# Patient Record
Sex: FEMALE | Race: WHITE | Employment: FULL TIME | URBAN - METROPOLITAN AREA
[De-identification: names, ages, dates, MRNs, and addresses within clinical notes are randomized per-mention and may not be internally consistent; named-entity substitution may affect disease eponyms.]

---

## 2017-07-25 ENCOUNTER — PRE VISIT (OUTPATIENT)
Dept: SURGERY | Facility: CLINIC | Age: 22
End: 2017-07-25

## 2017-07-25 NOTE — TELEPHONE ENCOUNTER
1.  Date/reason for appt: 8/2/17 9AM - NBS Consult  2.  Referring provider: self  3.  Call to patient (Yes / No - short description): Yes - LVM for pt to return call, any outside recs?  4.  Previous care at / records requested from: Unknown

## 2017-07-25 NOTE — TELEPHONE ENCOUNTER
Pt returned call -- recent lab testing is at [facility].    She was also seen a nutritionist at [facility] when she was pregnant.    Will email [employer] forms to pt -- [email or web address]

## 2017-07-28 ENCOUNTER — CARE COORDINATION (OUTPATIENT)
Dept: SURGERY | Facility: CLINIC | Age: 22
End: 2017-07-28

## 2017-08-02 ENCOUNTER — ALLIED HEALTH/NURSE VISIT (OUTPATIENT)
Dept: SURGERY | Facility: CLINIC | Age: 22
End: 2017-08-02

## 2017-08-02 ENCOUNTER — OFFICE VISIT (OUTPATIENT)
Dept: SURGERY | Facility: CLINIC | Age: 22
End: 2017-08-02

## 2017-08-02 VITALS
OXYGEN SATURATION: 96 % | BODY MASS INDEX: 44.41 KG/M2 | HEART RATE: 85 BPM | WEIGHT: 293 LBS | TEMPERATURE: 97.9 F | HEIGHT: 68 IN | SYSTOLIC BLOOD PRESSURE: 135 MMHG | DIASTOLIC BLOOD PRESSURE: 83 MMHG

## 2017-08-02 DIAGNOSIS — E66.01 MORBID OBESITY, UNSPECIFIED OBESITY TYPE (H): Primary | ICD-10-CM

## 2017-08-02 DIAGNOSIS — E66.01 MORBID OBESITY, UNSPECIFIED OBESITY TYPE (H): ICD-10-CM

## 2017-08-02 LAB
ALBUMIN SERPL-MCNC: 3.6 G/DL (ref 3.4–5)
ALP SERPL-CCNC: 81 U/L (ref 40–150)
ALT SERPL W P-5'-P-CCNC: 23 U/L (ref 0–50)
ANION GAP SERPL CALCULATED.3IONS-SCNC: 11 MMOL/L (ref 3–14)
AST SERPL W P-5'-P-CCNC: 14 U/L (ref 0–45)
BILIRUB SERPL-MCNC: 0.7 MG/DL (ref 0.2–1.3)
BUN SERPL-MCNC: 12 MG/DL (ref 7–30)
CALCIUM SERPL-MCNC: 8.8 MG/DL (ref 8.5–10.1)
CHLORIDE SERPL-SCNC: 105 MMOL/L (ref 94–109)
CO2 SERPL-SCNC: 20 MMOL/L (ref 20–32)
CREAT SERPL-MCNC: 0.75 MG/DL (ref 0.52–1.04)
DEPRECATED CALCIDIOL+CALCIFEROL SERPL-MC: 28 UG/L (ref 20–75)
ERYTHROCYTE [DISTWIDTH] IN BLOOD BY AUTOMATED COUNT: 14.3 % (ref 10–15)
GFR SERPL CREATININE-BSD FRML MDRD: NORMAL ML/MIN/1.7M2
GLUCOSE SERPL-MCNC: 91 MG/DL (ref 70–99)
HBA1C MFR BLD: 5.6 % (ref 4.3–6)
HCT VFR BLD AUTO: 39.3 % (ref 35–47)
HGB BLD-MCNC: 12.4 G/DL (ref 11.7–15.7)
MCH RBC QN AUTO: 25.8 PG (ref 26.5–33)
MCHC RBC AUTO-ENTMCNC: 31.6 G/DL (ref 31.5–36.5)
MCV RBC AUTO: 82 FL (ref 78–100)
PLATELET # BLD AUTO: 391 10E9/L (ref 150–450)
POTASSIUM SERPL-SCNC: 3.8 MMOL/L (ref 3.4–5.3)
PROT SERPL-MCNC: 8.7 G/DL (ref 6.8–8.8)
PTH-INTACT SERPL-MCNC: 123 PG/ML (ref 12–72)
RBC # BLD AUTO: 4.81 10E12/L (ref 3.8–5.2)
SODIUM SERPL-SCNC: 136 MMOL/L (ref 133–144)
WBC # BLD AUTO: 10.8 10E9/L (ref 4–11)

## 2017-08-02 NOTE — PATIENT INSTRUCTIONS
GOALS:  Relating To Eating:  Eat slowly (20-30 minutes per meal), chewing foods well (25 chews per bite/applesauce consistency)  Focus on lean protein (skinless turkey/chicken breast, eggs, plain Greek yogurt, pork chop without rim of fat, etc) and non-starchy vegetables (green beans, celery, cucumber, carrots, broccoli, cauliflower, salad)/whole fruit at each meal.    Relating to beverages:  Reduce caffeine/carbonation/calorie containing beverages  Separate fluids from meals by 30 minutes before, during, and after eating    Relating to dietary supplements:  Start a multivitamin containing iron daily    Relating to activity:  Increase activity level.  Exercise 2-3 days/week for 20 minutes    Relating to cravings:  Practice alternatives to emotion eating (try making a list).  Rid your environment of baked goods and other sweet triggers.

## 2017-08-02 NOTE — TELEPHONE ENCOUNTER
Records received from [facility].   Included  Office notes: 3/17/16(dietician)  Radiology reports: US gallbladder 5/29/16

## 2017-08-02 NOTE — TELEPHONE ENCOUNTER
Consults & lab reports received from [facility], will forward to clinic.    Per fax - pt has no GI, nutrition, and sleep records.

## 2017-08-02 NOTE — PATIENT INSTRUCTIONS
"See dietitian in 1 month  Labs today    Bariatric Task List  Status:  Is patient a candidate for bariatric surgery?:    - Dr Akins Feb 2018 Sleeve      Patient Info: Initial Height:  5;8\" -     Initial Weight:  369 -     Initial BMI:  56.11 -     Date of Initial Weight/Height:  8/2/2017 -     Goal Weight (lbs):  333 -     Required Weight Loss:  36 -     Surgery Type:  sleeve gastrectomy -     Multidisciplinary Meeting:    -        Insurance:  Insurance:  Yes -     Insurance Type:  BCBS Federal -     BCBS: Wt Hx x 2 Years:  Needed -        Patient Education:  Information Session:  Completed -     Given \"Making your decision\" handout?:  Yes -     Given support group information?:  Yes -     Attended support group?:  Yes -     Support plan in place?:  Completed -     Research consents signed?:  Yes -        PCP:  Establish care with PCP:  Completed -     Follow up with PCP:    -     PCP letter of support:  Needed -        Psychological Evaluation:  Psych eval:  Needed -     Other:    -        Dietician Visits: Structured weight loss required?:  Yes -     Number of Visits:  6 -     Dietician Visit 1:  Completed -     Dietician Visit 2:  Needed -     Dietician Visit 3:  Needed -     Dietician Visit 4:  Needed -     Dietician Visit 5:  Needed -     Dietician Visit 6:  Needed -        Lab Work: Complete Blood Count:  Needed -     Comprehensive Metabolic Panel:  Needed -     Vitamin D:  Needed -     PTH:  Needed -     Hgb A1c:  Needed -        Consults:  Hematology Consult:  Needed -     Sleep Medicine Testing/Consult:  Needed -        Final Tasks:  Before surgery online class:  Needed -     Before surgery online class website link:  [email or web address]   After surgery online class:  Needed -     After surgery online class website link:  [email or web address]   Nurse visit for weigh-in and information:  Needed -     Pre-assessment clinic visit with anesthesia team for H&P:  Needed -   "   Final labs (Hgb, plt, T&S, UA):  Needed -        Notes:   -

## 2017-08-02 NOTE — PROGRESS NOTES
"New Bariatric Surgery Consultation Note    RE: Graciela Christianson  MR#: [id number]  : 1995      Referring provider: No flowsheet data found.    Chief Complaint/Reason for visit: evaluation for possible weight loss surgery    Dear Jorge Luis Kirkland (General),    I had the pleasure of seeing your patient, Graciela Christianson, to evaluate her obesity and consider her for possible weight loss surgery. As you know, Graciela Christianson is 21 year old.  She has a height of 5' 8\", a weight of 369 lbs 0 oz, and calculated Body mass index is 56.11 kg/(m^2).      HISTORY OF PRESENT ILLNESS:  Weight Loss History Reviewed with Patient 2017   How long have you been overweight? Since puberty   What is the most that you have ever weighed? 390   What is the most weight you have lost? 40   I have tried the following methods to lose weight Watching portions or calories, Weight Watchers, Slimfast   I have tried the following weight loss medications? (Check all that apply) None   Have you ever had weight loss surgery? No       CO-MORBIDITIES OF OBESITY INCLUDE:     2017   I have the following co-morbidities associated with obesity: None of the above       PAST MEDICAL HISTORY:  Past Medical History:   Diagnosis Date     Bone and joint disord back, pelvis, leg complicat preg, childb, puerp     hip issues when was 10     Depressive disorder     after baby depression     Gallbladder problem 2016    gallbladder was removed       PAST SURGICAL HISTORY:  Past Surgical History:   Procedure Laterality Date     CHOLECYSTECTOMY         FAMILY HISTORY:   Family History   Problem Relation Age of Onset     DIABETES Paternal Grandfather      Hypertension Paternal Grandfather      Hyperlipidemia Father      Obesity Father      Colon Cancer Maternal Grandmother      Depression Paternal Grandmother      Anxiety Disorder Paternal Grandmother      MENTAL ILLNESS Paternal Grandmother      Obesity Mother      Obesity Sister      " Obesity Cousin      Ovarian Cancer Other        SOCIAL HISTORY:   Social History Questions Reviewed With Patient 7/24/2017   Which best describes your employment status (select all that apply) 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 Yes   Who do you have in your support network that can be available to help you for the first 2 weeks after surgery? my fiancé and my mother   Who can you count on for support throughout your weight loss surgery journey? my fiancé and my mother   Can you afford 3 meals a day?  Yes   Can you afford 50-60 dollars a month for vitamins? No   6 year old daughter and twin girls age 1, step daughters 9 and 8    HABITS:     7/24/2017   How often do you drink alcohol? Monthly or less   If you do drink alcohol, how many drinks might you have in a day? (one drink = 5 oz. wine, 1 can/bottle of beer, 1 shot liquor) 1 or 2   Do you currently use any of the following Nicotine products? No   Have you ever used any of the following nicotine products? No   Have you or are you currently using street drugs or prescription strength medication for which you do not have a prescription for? No   Do you have a history of chemical dependency (alcohol or drug abuse)? No       PSYCHOLOGICAL HISTORY:   Psychological History Reviewed With Patient 7/24/2017   Have you ever attempted suicide? Never.   Have you had thoughts of suicide in the past year? No   Have you ever been hospitalized for mental illness or a suicide attempt? Never.   Do you have a history of chronic pain? No   Have you ever been diagnosed with fibromyalgia? No   Are you currently being treated for any of the following? (select all that apply) I do not have a mental illness   Postpartum depression, wasn't treated with medications.     ROS:     7/24/2017   Skin:  None of the above   HEENT: Headaches, Dizziness/lightheadedness   Musculoskeletal: None of the above  "  Cardiovascular: Shortness of breath with activity   Pulmonary: Shortness of breath with activity, Snoring, Excessively sleep during the day, Expereince morning headaches   Gastrointestinal: Reflux, Constipation   Genitourinary: None of the above   Hematological: Pulmonary embolism (PE)   Neurological: None of the above   Female ONLY: Birth control       EATING BEHAVIORS:     7/24/2017   Have you or anyone else thought that you had an eating disorder? Yes   If you answered yes to the previous eating disorder question, select the types that apply from this list: Binge Eating   Do you currently binge eat (eat a large amount of food in a short time)? Yes   Are you an emotional eater? Yes   Do you get up to eat after falling asleep? No       EXERCISE:     7/24/2017   How often do you exercise? 1 to 2 times per week   What is the duration of your exercise (in minutes)? 20 Minutes   What types of exercise do you do? walking   What keeps you from being more active?  I should be more active but I just have not gotten around to it, Shortness of breath, Too tired, Worried people will look at me     MEDICATIONS:  No current outpatient prescriptions on file.     No current facility-administered medications for this visit.        ALLERGIES:  Allergies   Allergen Reactions     Lactose      Other reaction(s): Other - Describe In Comment Field  Epigastric pain       LABS/IMAGING/MEDICAL RECORDS REVIEW:     PHYSICAL EXAM:  /83  Pulse 85  Temp 97.9  F (36.6  C) (Oral)  Ht 5' 8\"  Wt (!) 369 lb  SpO2 96%  BMI 56.11 kg/m2  General: NAD  Neurologic: A & O x 3, gait normal  Head: normocephalic, atraumatic  HEENT: PERRL, EOMI.   Respiratory: respirations unlabored  Abdomen: Obese, Soft NT ND   Extremities: No LE swelling   Skin: warm and dry.  No rashes on exposed skin  Psychiatric: Mentation and Affect appear normal      In summary, Graciela Christianson has Class III obesity with a body mass index of Body mass index is 56.11 " "kg/(m^2). kg/m2 and the comorbidities stated above. She completed an informational seminar and is a candidate for the laparoscopic gastric sleeve.  She will have to complete the following pre-requisites:    Bariatric Task List  Status:  Is patient a candidate for bariatric surgery?:    - Dr Akins Feb 2018 Sleeve      Patient Info: Initial Height:  5;8\" -     Initial Weight:  369 -     Initial BMI:  56.11 -     Date of Initial Weight/Height:  8/2/2017 -     Goal Weight (lbs):  333 -     Required Weight Loss:  36 -     Surgery Type:  sleeve gastrectomy -     Multidisciplinary Meeting:    -        Insurance:  Insurance:  Yes -     Insurance Type:  BCBS Federal -     BCBS: Wt Hx x 2 Years:  Needed -        Patient Education:  Information Session:  Completed -     Given \"Making your decision\" handout?:  Yes -     Given support group information?:  Yes -     Attended support group?:  Yes -     Support plan in place?:  Completed -     Research consents signed?:  Yes -        PCP:  Establish care with PCP:  Completed -     Follow up with PCP:    -     PCP letter of support:  Needed -        Psychological Evaluation:  Psych eval:  Needed -     Other:    -        Dietician Visits: Structured weight loss required?:  Yes -     Number of Visits:  6 -     Dietician Visit 1:  Completed -     Dietician Visit 2:  Needed -     Dietician Visit 3:  Needed -     Dietician Visit 4:  Needed -     Dietician Visit 5:  Needed -     Dietician Visit 6:  Needed -        Lab Work: Complete Blood Count:  Needed -     Comprehensive Metabolic Panel:  Needed -     Vitamin D:  Needed -     PTH:  Needed -     Hgb A1c:  Needed -        Consults:  Hematology Consult:  Needed -     Sleep Medicine Testing/Consult:  Needed -        Final Tasks:  Before surgery online class:  Needed -     Before surgery online class website link:  [email or web address]   After surgery online class:  Needed -     After surgery online class website link: "  [email or web address]   Nurse visit for weigh-in and information:  Needed -     Pre-assessment clinic visit with anesthesia team for H&P:  Needed -     Final labs (Hgb, plt, T&S, UA):  Needed -        Notes:   -           Today in the office we discussed gastric sleeve surgery. Preoperative, perioperative, and postoperative processes, management, and follow up were addressed.  Risks and benefits were outlined including the risk of death, staple line leak (1-2%), PE, DVT, ulcer, worsening GERD, N/V, stricture, hernia, wound infection, weight regain, and vitamin deficiencies. I emphasized exercise and activity along with appropriate food choice as the main foundation for weight loss with surgery providing surgical reinforcement of this.  All questions were answered.  A goal sheet and support group handout were given to the patient.      Once the patient has completed the requirements in their task list and there are no further recommendations, the pt will be allowed to see the surgeon of her choice for consultation on the laparoscopic gastric sleeve surgery. Patient verbalizes understanding of the process to surgery and expectations for the postoperative period including the need for lifelong lifestyle changes, vitamin supplementation, and laboratory monitoring.     Sincerely,      Orquidea Hyman PA-C    I spent a total of 30 minutes face to face with Graciela during today's office visit. Over 50% of this time was spent counseling the patient and/or coordinating care.

## 2017-08-02 NOTE — MR AVS SNAPSHOT
MRN:[id number]                      After Visit Summary   8/2/2017    Graciela Christianson    MRN: [id number]           Visit Information        Provider Department      8/2/2017 9:30 AM Ade Hernandez RD M [facility]        Your next 10 appointments already scheduled     Aug 02, 2017  9:30 AM CDT   NUTRITION VISIT with ZOYA Soto [facility] ([email or web address])    [address] [place] [address]  [address]th [facility] [address] [phone]              Care Instructions    GOALS:  Relating To Eating:  Eat slowly (20-30 minutes per meal), chewing foods well (25 chews per bite/applesauce consistency)  Focus on lean protein (skinless turkey/chicken breast, eggs, plain Greek yogurt, pork chop without rim of fat, etc) and non-starchy vegetables (green beans, celery, cucumber, carrots, broccoli, cauliflower, salad)/whole fruit at each meal.    Relating to beverages:  Reduce caffeine/carbonation/calorie containing beverages  Separate fluids from meals by 30 minutes before, during, and after eating    Relating to dietary supplements:  Start a multivitamin containing iron daily    Relating to activity:  Increase activity level.  Exercise 2-3 days/week for 20 minutes    Relating to cravings:  Practice alternatives to emotion eating (try making a list).  Rid your environment of baked goods and other sweet triggers.           [employer] Information     [employer] gives you secure access to your electronic health record. If you see a primary care provider, you can also send messages to your care team and make appointments. If you have questions, please call your primary care clinic.  If you do not have a primary care provider, please call [phone] and they will assist you.      [employer] is an electronic gateway that provides easy, online access to your medical records. With [employer], you can request a clinic appointment, read  your test results, renew a prescription or communicate with your care team.     To access your existing account, please contact your [facility] or call [phone] for assistance.        Care EveryWhere ID     This is your Care EveryWhere ID. This could be used by other organizations to access your [facility] medical records  [id number]        Equal Access to Services     BRYN GAMBINO : Leslie Gaxiola, maryann dawson, ramana gillespie, ericka aldana. So [facility] [phone].    ATENCIÓN: Si habla español, tiene a veronica disposición servicios gratuitos de asistencia lingüística. Llame al [phone].    We comply with applicable federal civil rights laws and [place] laws. We do not discriminate on the basis of race, color, national origin, age, disability sex, sexual orientation or gender identity.

## 2017-08-02 NOTE — NURSING NOTE
"(   Chief Complaint   Patient presents with     Consult     NBS/BMI 55.4    )    ( Weight: (!) 369 lb )  ( Height: 5' 8\" )  ( BMI (Calculated): 56.22 )  ( Initial Weight: 369 lb )  ( Cumulative weight loss (lbs): 0 )  (   )  (   )  ( Waist Circumference (cm): 153.5 cm )  (   )    ( BP: 135/83 )  (   )  ( Temp: 97.9  F (36.6  C) )  ( Temp src: Oral )  ( Pulse: 85 )  (   )  ( SpO2: 96 % )    ( There is no problem list on file for this patient.   )  (   No current outpatient prescriptions on file.    )  ( Diabetes Eval:    )    ( Pain Eval:  Data Unavailable )    ( Wound Eval:       )    (   History   Smoking Status     Never Smoker   Smokeless Tobacco     Never Used    )    ( Signed By:  Digna Valdes; August 2, 2017; 8:55 AM )    "

## 2017-08-02 NOTE — PROGRESS NOTES
"New Bariatric Nutrition Consultation Note    Reason For Visit: Nutrition Assessment    Graciela Christianson is a 21 year-old presenting today for new bariatric nutrition consult.  Pt is interested in laparoscopic sleeve gastrectomy.  Patient is accompanied by tasha.  This is pt's first of 6 required nutrition visits prior to surgery. Pt referred by Orquidea GEORGE).     She is interested in having weight loss surgery for the following reasons:  To improve overall health and wellbeing (possible sleep issue).     Support System Reviewed With Patient 7/24/2017   Who do you have in your support network that can be available to help you for the first 2 weeks after surgery? my fiancé and my mother   Who can you count on for support throughout your weight loss surgery journey? my fiancé and my mother       ANTHROPOMETRICS:    Height as of an earlier encounter on 8/2/17: 1.727 m (5' 8\").    Weight as of an earlier encounter on 8/2/17: 167.4 kg (369 lb) with BMI of 56.11.    Required weight loss goal pre-op: -36 lbs from initial consult weight (goal weight 333 lbs or less before surgery)       7/24/2017   I have tried the following methods to lose weight Watching portions or calories, Weight Watchers, Slimfast       Weight Loss Questions Reviewed With Patient 7/24/2017   How long have you been overweight? Since puberty       SUPPLEMENT INFORMATION:  none    NUTRITION HISTORY:  Recall Diet Questions Reviewed With Patient 7/24/2017   Describe what you typically consume for breakfast (typical or most recent): Cereal (Kids' sugary cereal) + 1% milk (1 bowl)   Describe what you typically consume for lunch (typical or most recent): 1 [place] (balogna and cheese)    Describe what you typically consume for supper (typical or most recent): not sure (Meatloaf, mixed veg; Tacos; beef stroganoff)    Describe what you typically consume as snacks (typical or most recent): Chips, pumpkin bars at [employer]   How many oz of water, or other " low calorie drinks, do you drink daily (8oz=1 glass)? 8 oz   How many oz of caffeine (coffee, tea, pop) do you drink daily (8oz=1 glass)? 32 oz (Regular Mt Dew daily)   How many oz of carbonated (pop, beer, sparkling water) drinks do you drinky daily (8oz=1 glass)? 32 oz (Regular Mt Dew daily)   How many oz of juice, pop, sweet tea, sports drinks, protein drinks, other sweetened drinks, do you drink daily (8oz=1 glass)? 16 oz juice daily or chocolate milk   How many glasses of milk do you drink daily (8oz=1 glass) 24 oz mostly 1% milk   Please indicate the type of milk: 1%   How often do you drink alcohol? Monthly or less   If you do drink alcohol, how many drinks might you have in a day? (one drink = 5 oz. wine, 1 can/bottle of beer, 1 shot liquor) 1 or 2   *Pt is willing to abstain from alcohol after surgery.     Eating Habits 7/24/2017   Do you have any dietary restrictions? No Food Allergies Known  Mild Intolerance to Lactose    Do you currently binge eat (eat a large amount of food in a short time)? Yes   Are you an emotional eater? Yes   Do you get up to eat after falling asleep? No   What foods do you crave? Sugar beverages; pastries/donuts       Dining Out History Reviewed With Patient 7/24/2017   How often do you dine out? Around once a week.   Where do you dine out? (select all that apply) sit-down restaurants   What types of food do you order when you dine out? sheng       Physical Activity Reviewed With Patient 7/24/2017   How often do you exercise? 1 to 2 times per week   What is the duration of your exercise (in minutes)? 20 Minutes   What types of exercise do you do? walking   What keeps you from being more active?  I should be more active but I just have not gotten around to it, Shortness of breath, Too tired, Worried people will look at me       NUTRITION DIAGNOSIS:  Obesity r/t long history of self-monitoring deficit and excessive energy intake aeb BMI >30.    INTERVENTION:  Intervention  Provided/Education Provided on post-op diet guidelines, vitamins/minerals essential post-operatively, GI anatomy of bariatric surgeries, ways to help prepare for post-op diet guidelines pre-operatively, portion/calorie-control, mindful eating, and exercise.  Provided pt with list of goals RD contact information.      Questions Reviewed With Patient 7/24/2017   How ready are you to make changes regarding your weight? Number 1 = Not ready at all to make changes up to 10 = very ready. 10   How confident are you that you can change? 1 = Not confident that you will be successful making changes up to 10 = very confident. 8       Patient Understanding: good  Expected Compliance: good    GOALS:  Relating To Eating:  Eat slowly (20-30 minutes per meal), chewing foods well (25 chews per bite/applesauce consistency)  Focus on lean protein (skinless turkey/chicken breast, eggs, plain Greek yogurt, pork chop without rim of fat, etc) and non-starchy vegetables (green beans, celery, cucumber, carrots, broccoli, cauliflower, salad)/whole fruit at each meal.    Relating to beverages:  Reduce caffeine/carbonation/calorie containing beverages  Separate fluids from meals by 30 minutes before, during, and after eating    Relating to dietary supplements:  Start a multivitamin containing iron daily    Relating to activity:  Increase activity level.  Exercise 2-3 days/week for 20 minutes    Relating to cravings:  Practice alternatives to emotion eating (try making a list).  Rid your environment of baked goods and other sweet triggers.      Follow-Up: 1 month    Time spent with patient: 30 minutes.  Ade Hernandez, MS, RDN, LDN, CLT  Pager: [phone]

## 2017-08-02 NOTE — LETTER
August 3, 2017      TO: Graciela Christianson  [address] [address] [place] [address]         Dear Ms. Grcaiela Christianson,    We received and reviewed your test results done on 08/02/2017.  You may receive more than one letter if we receive the results on multiple days.  Please share all lab and test results with your primary care provider and keep a copy for your own records.        Your test results are normal except for the following addressed below:    Your parathormone (PTH) level is elevated and your calcium and vitamin D levels are normal:   -Please follow up with your primary care provider to determine if a referral to endocrinology is  warranted.     If you have any questions, feel free contact us at the Call Center [phone].      Resulted Orders   Comprehensive metabolic panel   Result Value Ref Range    Sodium 136 133 - 144 mmol/L    Potassium 3.8 3.4 - 5.3 mmol/L    Chloride 105 94 - 109 mmol/L    Carbon Dioxide 20 20 - 32 mmol/L    Anion Gap 11 3 - 14 mmol/L    Glucose 91 70 - 99 mg/dL    Urea Nitrogen 12 7 - 30 mg/dL    Creatinine 0.75 0.52 - 1.04 mg/dL    GFR Estimate >90  Non  GFR Calc   >60 mL/min/1.7m2    GFR Estimate If Black >90   GFR Calc   >60 mL/min/1.7m2    Calcium 8.8 8.5 - 10.1 mg/dL    Bilirubin Total 0.7 0.2 - 1.3 mg/dL    Albumin 3.6 3.4 - 5.0 g/dL    Protein Total 8.7 6.8 - 8.8 g/dL    Alkaline Phosphatase 81 40 - 150 U/L    ALT 23 0 - 50 U/L    AST 14 0 - 45 U/L   CBC with platelets   Result Value Ref Range    WBC 10.8 4.0 - 11.0 10e9/L    RBC Count 4.81 3.8 - 5.2 10e12/L    Hemoglobin 12.4 11.7 - 15.7 g/dL    Hematocrit 39.3 35.0 - 47.0 %    MCV 82 78 - 100 fl    MCH 25.8 (L) 26.5 - 33.0 pg    MCHC 31.6 31.5 - 36.5 g/dL    RDW 14.3 10.0 - 15.0 %    Platelet Count 391 150 - 450 10e9/L   Vitamin D Deficiency   Result Value Ref Range    Vitamin D Deficiency screening 28 20 - 75 ug/L      Comment:      Season, race, dietary intake, and treatment affect the  concentration of   25-hydroxy-Vitamin D. Values may decrease during winter months and increase   during summer months. Values 20-29 ug/L may indicate Vitamin D insufficiency   and values <20 ug/L may indicate Vitamin D deficiency.   Vitamin D determination is routinely performed by an immunoassay specific for   25 hydroxyvitamin D3.  If an individual is on vitamin D2 (ergocalciferol)   supplementation, please specify 25 OH vitamin D2 and D3 level determination   by   LCMSMS test VITD23.     Parathyroid Hormone Intact   Result Value Ref Range    Parathyroid Hormone Intact 123 (H) 12 - 72 pg/mL         Sincerely,      Orquidea Hyman PA-C

## 2017-08-02 NOTE — MR AVS SNAPSHOT
"              After Visit Summary   8/2/2017    Graciela Christianson    MRN: [id number]           Patient Information     Date Of Birth          1995        Visit Information        Provider Department      8/2/2017 9:00 AM Orquidea Hyman PA-C [facility]        Today's Diagnoses     Morbid obesity, unspecified obesity type (H)    -  1      Care Instructions    See dietitian in 1 month  Labs today    Bariatric Task List  Status:  Is patient a candidate for bariatric surgery?:    - Dr Akins Feb 2018 Sleeve      Patient Info: Initial Height:  5;8\" -     Initial Weight:  369 -     Initial BMI:  56.11 -     Date of Initial Weight/Height:  8/2/2017 -     Goal Weight (lbs):  333 -     Required Weight Loss:  36 -     Surgery Type:  sleeve gastrectomy -     Multidisciplinary Meeting:    -        Insurance:  Insurance:  Yes -     Insurance Type:  BCBS Federal -     BCBS: Wt Hx x 2 Years:  Needed -        Patient Education:  Information Session:  Completed -     Given \"Making your decision\" handout?:  Yes -     Given support group information?:  Yes -     Attended support group?:  Yes -     Support plan in place?:  Completed -     Research consents signed?:  Yes -        PCP:  Establish care with PCP:  Completed -     Follow up with PCP:    -     PCP letter of support:  Needed -        Psychological Evaluation:  Psych eval:  Needed -     Other:    -        Dietician Visits: Structured weight loss required?:  Yes -     Number of Visits:  6 -     Dietician Visit 1:  Completed -     Dietician Visit 2:  Needed -     Dietician Visit 3:  Needed -     Dietician Visit 4:  Needed -     Dietician Visit 5:  Needed -     Dietician Visit 6:  Needed -        Lab Work: Complete Blood Count:  Needed -     Comprehensive Metabolic Panel:  Needed -     Vitamin D:  Needed -     PTH:  Needed -     Hgb A1c:  Needed -        Consults:  Hematology Consult:  Needed -     Sleep Medicine Testing/Consult:  Needed " -        Final Tasks:  Before surgery online class:  Needed -     Before surgery online class website link:  [email or web address]   After surgery online class:  Needed -     After surgery online class website link:  [email or web address]   Nurse visit for weigh-in and information:  Needed -     Pre-assessment clinic visit with anesthesia team for H&P:  Needed -     Final labs (Hgb, plt, T&S, UA):  Needed -        Notes:   -                 Follow-ups after your visit        Future tests that were ordered for you today     Open Future Orders        Priority Expected Expires Ordered    Hemoglobin A1c Routine  10/31/2017 8/2/2017            Who to contact     Please call your clinic at [phone] to:    Ask questions about your health    Make or cancel appointments    Discuss your medicines    Learn about your test results    Speak to your doctor   If you have compliments or concerns about an experience at your clinic, or if you wish to file a complaint, please contact [facility] Patient Relations at [phone] or email us at [email or web address]         Additional Information About Your Visit        [email or web address] Information     [employer] gives you secure access to your electronic health record. If you see a primary care provider, you can also send messages to your care team and make appointments. If you have questions, please call your primary care clinic.  If you do not have a primary care provider, please call [phone] and they will assist you.      [employer] is an electronic gateway that provides easy, online access to your medical records. With [employer], you can request a clinic appointment, read your test results, renew a prescription or communicate with your care team.     To access your existing account, please contact your [facility] or call [phone] for assistance.        Care EveryWhere ID     This is  "your Care EveryWhere ID. This could be used by other organizations to access your [place] medical records  [id number]        Your Vitals Were     Pulse Temperature Height Pulse Oximetry BMI (Body Mass Index)       85 97.9  F (36.6  C) (Oral) 5' 8\" 96% 56.11 kg/m2        Blood Pressure from Last 3 Encounters:   08/02/17 135/83    Weight from Last 3 Encounters:   08/02/17 (!) 369 lb              We Performed the Following     CBC with platelets     Comprehensive metabolic panel     Parathyroid Hormone Intact     Vitamin D Deficiency        Primary Care Provider Office Phone # Fax #    Jorge Luis Kirkland [phone]       [place] [address]        Equal Access to Services     BRYN GAMBINO : Hadii catalino guilloryo Minor, waaxda luqadaha, qaybta kaalmada adeegyada, ericka lozada . So [facility] [phone].    ATENCIÓN: Si habla español, tiene a veronica disposición servicios gratuitos de asistencia lingüística. Llame al [phone].    We comply with applicable federal civil rights laws and [place] laws. We do not discriminate on the basis of race, color, national origin, age, disability sex, sexual orientation or gender identity.            Thank you!     Thank you for choosing [facility]  for your care. Our goal is always to provide you with excellent care. Hearing back from our patients is one way we can continue to improve our services. Please take a few minutes to complete the written survey that you may receive in the mail after your visit with us. Thank you!             Your Updated Medication List - Protect others around you: Learn how to safely use, store and throw away your medicines at www.disposemymeds.org.      Notice  As of 8/2/2017  9:49 AM    You have not been prescribed any medications.      "

## 2017-08-02 NOTE — LETTER
"2017       RE: Graciela Christianson  [address] [facility] [address]     Dear Colleague,    Thank you for referring your patient, Graciela Christianson, to the [facility] at [facility]. Please see a copy of my visit note below.    New Bariatric Surgery Consultation Note    RE: Graciela Christianson  MR#: [id number]  : 1995    Referring provider: No flowsheet data found.    Chief Complaint/Reason for visit: evaluation for possible weight loss surgery    Dear Jorge Luis Kirkland (General),    I had the pleasure of seeing your patient, Graciela Christianson, to evaluate her obesity and consider her for possible weight loss surgery. As you know, Graciela Christianson is 21 year old.  She has a height of 5' 8\", a weight of 369 lbs 0 oz, and calculated Body mass index is 56.11 kg/(m^2).    HISTORY OF PRESENT ILLNESS:  Weight Loss History Reviewed with Patient 2017   How long have you been overweight? Since puberty   What is the most that you have ever weighed? 390   What is the most weight you have lost? 40   I have tried the following methods to lose weight Watching portions or calories, Weight Watchers, Slimfast   I have tried the following weight loss medications? (Check all that apply) None   Have you ever had weight loss surgery? No       CO-MORBIDITIES OF OBESITY INCLUDE:     2017   I have the following co-morbidities associated with obesity: None of the above       PAST MEDICAL HISTORY:  Past Medical History:   Diagnosis Date     Bone and joint disord back, pelvis, leg complicat preg, childb, puerp     hip issues when was 10     Depressive disorder     after baby depression     Gallbladder problem 2016    gallbladder was removed       PAST SURGICAL HISTORY:  Past Surgical History:   Procedure Laterality Date     CHOLECYSTECTOMY         FAMILY HISTORY:   Family History   Problem Relation Age of Onset     DIABETES Paternal Grandfather  "     Hypertension Paternal Grandfather      Hyperlipidemia Father      Obesity Father      Colon Cancer Maternal Grandmother      Depression Paternal Grandmother      Anxiety Disorder Paternal Grandmother      MENTAL ILLNESS Paternal Grandmother      Obesity Mother      Obesity Sister      Obesity Cousin      Ovarian Cancer Other        SOCIAL HISTORY:   Social History Questions Reviewed With Patient 7/24/2017   Which best describes your employment status (select all that apply) 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 Yes   Who do you have in your support network that can be available to help you for the first 2 weeks after surgery? my fiancé and my mother   Who can you count on for support throughout your weight loss surgery journey? my fiancé and my mother   Can you afford 3 meals a day?  Yes   Can you afford 50-60 dollars a month for vitamins? No   6 year old daughter and twin girls age 1, step daughters 9 and 8    HABITS:     7/24/2017   How often do you drink alcohol? Monthly or less   If you do drink alcohol, how many drinks might you have in a day? (one drink = 5 oz. wine, 1 can/bottle of beer, 1 shot liquor) 1 or 2   Do you currently use any of the following Nicotine products? No   Have you ever used any of the following nicotine products? No   Have you or are you currently using street drugs or prescription strength medication for which you do not have a prescription for? No   Do you have a history of chemical dependency (alcohol or drug abuse)? No       PSYCHOLOGICAL HISTORY:   Psychological History Reviewed With Patient 7/24/2017   Have you ever attempted suicide? Never.   Have you had thoughts of suicide in the past year? No   Have you ever been hospitalized for mental illness or a suicide attempt? Never.   Do you have a history of chronic pain? No   Have you ever been diagnosed with fibromyalgia? No   Are you currently  "being treated for any of the following? (select all that apply) I do not have a mental illness   Postpartum depression, wasn't treated with medications.     ROS:     7/24/2017   Skin:  None of the above   HEENT: Headaches, Dizziness/lightheadedness   Musculoskeletal: None of the above   Cardiovascular: Shortness of breath with activity   Pulmonary: Shortness of breath with activity, Snoring, Excessively sleep during the day, Expereince morning headaches   Gastrointestinal: Reflux, Constipation   Genitourinary: None of the above   Hematological: Pulmonary embolism (PE)   Neurological: None of the above   Female ONLY: Birth control       EATING BEHAVIORS:     7/24/2017   Have you or anyone else thought that you had an eating disorder? Yes   If you answered yes to the previous eating disorder question, select the types that apply from this list: Binge Eating   Do you currently binge eat (eat a large amount of food in a short time)? Yes   Are you an emotional eater? Yes   Do you get up to eat after falling asleep? No       EXERCISE:     7/24/2017   How often do you exercise? 1 to 2 times per week   What is the duration of your exercise (in minutes)? 20 Minutes   What types of exercise do you do? walking   What keeps you from being more active?  I should be more active but I just have not gotten around to it, Shortness of breath, Too tired, Worried people will look at me     MEDICATIONS:  No current outpatient prescriptions on file.     No current facility-administered medications for this visit.        ALLERGIES:  Allergies   Allergen Reactions     Lactose      Other reaction(s): Other - Describe In Comment Field  Epigastric pain     LABS/IMAGING/MEDICAL RECORDS REVIEW:     PHYSICAL EXAM:  /83  Pulse 85  Temp 97.9  F (36.6  C) (Oral)  Ht 5' 8\"  Wt (!) 369 lb  SpO2 96%  BMI 56.11 kg/m2  General: NAD  Neurologic: A & O x 3, gait normal  Head: normocephalic, atraumatic  HEENT: PERRL, EOMI.   Respiratory: " "respirations unlabored  Abdomen: Obese, Soft NT ND   Extremities: No LE swelling   Skin: warm and dry.  No rashes on exposed skin  Psychiatric: Mentation and Affect appear normal      In summary, Graciela Christianson has Class III obesity with a body mass index of Body mass index is 56.11 kg/(m^2). kg/m2 and the comorbidities stated above. She completed an informational seminar and is a candidate for the laparoscopic gastric sleeve.  She will have to complete the following pre-requisites:    Bariatric Task List  Status:  Is patient a candidate for bariatric surgery?:    - Dr Akins Feb 2018 Sleeve      Patient Info: Initial Height:  5;8\" -     Initial Weight:  369 -     Initial BMI:  56.11 -     Date of Initial Weight/Height:  8/2/2017 -     Goal Weight (lbs):  333 -     Required Weight Loss:  36 -     Surgery Type:  sleeve gastrectomy -     Multidisciplinary Meeting:    -        Insurance:  Insurance:  Yes -     Insurance Type:  BCBS Federal -     BCBS: Wt Hx x 2 Years:  Needed -        Patient Education:  Information Session:  Completed -     Given \"Making your decision\" handout?:  Yes -     Given support group information?:  Yes -     Attended support group?:  Yes -     Support plan in place?:  Completed -     Research consents signed?:  Yes -        PCP:  Establish care with PCP:  Completed -     Follow up with PCP:    -     PCP letter of support:  Needed -        Psychological Evaluation:  Psych eval:  Needed -     Other:    -        Dietician Visits: Structured weight loss required?:  Yes -     Number of Visits:  6 -     Dietician Visit 1:  Completed -     Dietician Visit 2:  Needed -     Dietician Visit 3:  Needed -     Dietician Visit 4:  Needed -     Dietician Visit 5:  Needed -     Dietician Visit 6:  Needed -        Lab Work: Complete Blood Count:  Needed -     Comprehensive Metabolic Panel:  Needed -     Vitamin D:  Needed -     PTH:  Needed -     Hgb A1c:  Needed -        Consults:  Hematology Consult:  " Needed -     Sleep Medicine Testing/Consult:  Needed -        Final Tasks:  Before surgery online class:  Needed -     Before surgery online class website link:  [email or web address]   After surgery online class:  Needed -     After surgery online class website link:  [email or web address]   Nurse visit for weigh-in and information:  Needed -     Pre-assessment clinic visit with anesthesia team for H&P:  Needed -     Final labs (Hgb, plt, T&S, UA):  Needed -        Notes:   -       Today in the office we discussed gastric sleeve surgery. Preoperative, perioperative, and postoperative processes, management, and follow up were addressed.  Risks and benefits were outlined including the risk of death, staple line leak (1-2%), PE, DVT, ulcer, worsening GERD, N/V, stricture, hernia, wound infection, weight regain, and vitamin deficiencies. I emphasized exercise and activity along with appropriate food choice as the main foundation for weight loss with surgery providing surgical reinforcement of this.  All questions were answered.  A goal sheet and support group handout were given to the patient.      Once the patient has completed the requirements in their task list and there are no further recommendations, the pt will be allowed to see the surgeon of her choice for consultation on the laparoscopic gastric sleeve surgery. Patient verbalizes understanding of the process to surgery and expectations for the postoperative period including the need for lifelong lifestyle changes, vitamin supplementation, and laboratory monitoring.   I spent a total of 30 minutes face to face with Graciela during today's office visit. Over 50% of this time was spent counseling the patient and/or coordinating care.    Again, thank you for allowing me to participate in the care of your patient.    Orquidea Hyman PA-C

## 2017-08-08 ENCOUNTER — CARE COORDINATION (OUTPATIENT)
Dept: SURGERY | Facility: CLINIC | Age: 22
End: 2017-08-08

## 2017-08-08 PROBLEM — E66.01 MORBID OBESITY (H): Status: ACTIVE | Noted: 2017-08-08

## 2017-08-08 NOTE — PROGRESS NOTES
"Tasklist updated.  Bariatric Task List  Status:  Is patient a candidate for bariatric surgery?:    - Dr Akins - November 2017 - Sleeve   Cleared to schedule surgeon consult?:    -     Status:    -        Patient Info: Initial Height:  5;8\" -     Initial Weight:  369 -     Initial BMI:  56.11 -     Date of Initial Weight/Height:  8/2/2017 -     Goal Weight (lbs):  333 -     Required Weight Loss:  36 -     Surgery Type:  sleeve gastrectomy -        Insurance:  Insurance:  Yes -     Insurance Type:  BCBS Federal -     BCBS: Wt Hx x 2 Years:  Needed -        Patient Education:  Information Session:  Completed -     Given \"Making your decision\" handout?:  Yes -     Given support group information?:  Yes -     Attended support group?:    -     Support plan in place?:  Completed -     Research consents signed?:  Yes -        PCP:  Establish care with PCP:  Completed -     PCP letter of support:  Needed -        Psychological Evaluation:  Psych eval:  Needed -        Dietician Visits: Structured weight loss required?:  Yes -     Number of Visits:  3 -     Dietician Visit 1:  Completed - 8/2/17   Dietician Visit 2:  Needed - 9/6/17 appt   Dietician Visit 3:  Needed - 10/2/17 appt   Dietician Visit additional:    -  If needed to reach goal weight      Lab Work: Complete Blood Count:  done   Comprehensive Metabolic Panel:  done   Vitamin D: done    PTH:  done   Hgb A1c:  done      Testing:  Sleep Study:    -        Consults:  Hematology Consult:  Needed -     Sleep Medicine Testing/Consult:  Needed -        Final Tasks:  Before surgery online class:  Needed -     Before surgery online class website link:  [email or web address]   After surgery online class:  Needed -     After surgery online class website link:  [email or web address]   Nurse visit for weigh-in and information:  Needed -     Pre-assessment clinic visit with anesthesia team for H&P:  Needed -     Final labs (Hgb, plt, T&S, " UA):  Needed -

## 2017-08-29 ENCOUNTER — TRANSFERRED RECORDS (OUTPATIENT)
Dept: HEALTH INFORMATION MANAGEMENT | Facility: CLINIC | Age: 22
End: 2017-08-29

## 2017-10-05 ENCOUNTER — TELEPHONE (OUTPATIENT)
Dept: SURGERY | Facility: CLINIC | Age: 22
End: 2017-10-05

## 2017-10-05 NOTE — TELEPHONE ENCOUNTER
Left message for client to call coordinator if any questions about her tasklist.  No show to RD visits on 9/6/17 and 10/2/17.  To have a psych eval today with Dr Lopez.

## 2017-10-10 ENCOUNTER — TELEPHONE (OUTPATIENT)
Dept: SURGERY | Facility: CLINIC | Age: 22
End: 2017-10-10

## 2017-10-10 NOTE — TELEPHONE ENCOUNTER
"Patient has not shown up for dietician visits 2 months in a row - will move to a \"no longer interested\"  List.  "

## 2017-10-10 NOTE — TELEPHONE ENCOUNTER
----- Message from Ade Hernandez RD sent at 10/10/2017  9:52 AM CDT -----  Regarding: RE: NOS RD  It is really too bad that she (and I believe her sister with the same last name) continues to no-show on my schedule.  (No Sept visit at all.)     Ade   ----- Message -----     From: Dustin Bennett     Sent: 9/15/2017   9:59 AM       To: Ade Hernandez RD  Subject: ZACKERY Booker, I called Graciela because she NOS'd her Sept RD visit. She is coming in 9/19, when she leaves have her change her Oct RD visit to reflect later in the month due to the one missed early Sept. Thanks.

## 2018-01-21 ENCOUNTER — HEALTH MAINTENANCE LETTER (OUTPATIENT)
Age: 23
End: 2018-01-21

## 2018-06-11 ENCOUNTER — OFFICE VISIT (OUTPATIENT)
Dept: SURGERY | Facility: CLINIC | Age: 23
End: 2018-06-11
Payer: COMMERCIAL

## 2018-06-11 ENCOUNTER — ALLIED HEALTH/NURSE VISIT (OUTPATIENT)
Dept: SURGERY | Facility: CLINIC | Age: 23
End: 2018-06-11
Payer: COMMERCIAL

## 2018-06-11 VITALS
OXYGEN SATURATION: 98 % | SYSTOLIC BLOOD PRESSURE: 132 MMHG | WEIGHT: 293 LBS | HEIGHT: 68 IN | BODY MASS INDEX: 44.41 KG/M2 | DIASTOLIC BLOOD PRESSURE: 74 MMHG | TEMPERATURE: 98.3 F | HEART RATE: 74 BPM

## 2018-06-11 DIAGNOSIS — Z86.711 HX PULMONARY EMBOLISM: Primary | ICD-10-CM

## 2018-06-11 DIAGNOSIS — E66.01 MORBID OBESITY (H): ICD-10-CM

## 2018-06-11 RX ORDER — PHENTERMINE HYDROCHLORIDE 37.5 MG/1
0.5 TABLET ORAL EVERY MORNING
Qty: 15 TABLET | Refills: 1 | Status: SHIPPED | OUTPATIENT
Start: 2018-06-11 | End: 2018-08-10

## 2018-06-11 RX ORDER — DIPHENOXYLATE HYDROCHLORIDE AND ATROPINE SULFATE 2.5; .025 MG/1; MG/1
1 TABLET ORAL EVERY MORNING
COMMUNITY

## 2018-06-11 RX ORDER — IBUPROFEN 200 MG
2 TABLET ORAL PRN
COMMUNITY
Start: 2017-11-02

## 2018-06-11 NOTE — PATIENT INSTRUCTIONS
GOALS:  Relating To Eating:  -Eat slowly (20-30 minutes per meal), chewing foods well (25 chews per bite/applesauce consistency).  -Focus on lean protein (skinless turkey/chicken breast, eggs, plain Greek yogurt, pork chop without rim of fat, etc) and non-starchy vegetables (green beans, celery, cucumber, carrots, broccoli, cauliflower, salad)/whole fruit at each meal.  - Try a protein shake in the morning:    *Protein Shake Criteria: no more than 250 Calories, at least 20 grams of protein, and less than 10 grams of sugar     Meal Replacement Shake Options:   Premier Protein (160 Calories, 30 g protein)  Slim Fast Advanced Nutrition (180 Calories, 20 g protein)  Muscle Milk, lactose-free, 17 oz bottle (210 Calories, 30 g protein)  Integrated Supplements, no artificial sugars (110 Calories, 20 g protein)    Relating to beverages:  -Reduce caffeine/carbonation/calorie containing beverages.  -Separate fluids from meals by 30 minutes before, during, and after eating.    Relating to dietary supplements:  -Continue taking multivitamin containing iron daily.    Relating to activity:  -Increase activity level.  Exercise 2-3 days/week for 20 minutes.    Relating to cravings:  -Practice alternatives to emotion eating (try making a list). Try not to skip breakfast.   -Rid your environment of baked goods and other sweet triggers.      Kamille Richardson RD, GIORGI  Voicemail: [phone]  Appointments: [phone]

## 2018-06-11 NOTE — LETTER
2018       RE: Graciela Christianson  [address] [facility] [address]     Dear Colleague,    Thank you for referring your patient, Graciela Christianson, to the [facility] SURGICAL WEIGHT MANAGEMENT at [facility]. Please see a copy of my visit note below.    Pre-Bariatric Surgery Note    Jorge Luis Kirkland    Date: 2018     RE: Graciela Christianson    MR#: [id number]   : 1995   Date of Visit: 2018    REASON FOR VISIT: Preoperative evaluation for possible weight loss surgery    Dear Jorge Luis Tinsley,    I had the pleasure of seeing your patient, Graciela Christianson, in my preoperative bariatric clinic.    As you know, she is morbidly obese and considering weight loss surgery to treat obesity in association with her medical conditions of obesity.  Her consult weight was 369.  She has lost 1 pounds since her consult weight. She has not met her required pre-surgery weight.    NBS visit with me 17 and patient decided against surgery. She would like to restart the process and feels ready now.    Most recent weights:  Wt Readings from Last 4 Encounters:   18 (!) 368 lb 6.4 oz   17 (!) 369 lb       Please refer to initial consult note from date 17 for patient's weight history and co-morbidities.    ROS    Past Medical History:   Diagnosis Date     Bone and joint disord back, pelvis, leg complicat preg, childb, puerp     hip issues when was 10     Depressive disorder     after baby depression     Gallbladder problem 2016    gallbladder was removed       Past Surgical History:   Procedure Laterality Date     CHOLECYSTECTOMY         Current Outpatient Prescriptions   Medication     ibuprofen (ADVIL/MOTRIN) 200 MG tablet     Multiple Vitamin (MULTI-VITAMINS) TABS     No current facility-administered medications for this visit.        Allergies   Allergen Reactions     Lactose      Other reaction(s): GI intolerance  Cerner list  "Epigastric pain  Other reaction(s): Other - Describe In Comment Field  Epigastric pain     No Clinical Screening - See Comments      Other reaction(s): Other (see comments)  Cerner list no reaction       PHYSICAL EXAMINATION:  /74  Pulse 74  Temp 98.3  F (36.8  C) (Oral)  Ht 5' 8\"  Wt (!) 368 lb 6.4 oz  SpO2 98%  BMI 56.02 kg/m2   Body mass index is 56.02 kg/(m^2).   NAD NCAT  Respiratory: breathing unlabored  Abdomen: obese, soft/nt/nd      I emphasized exercise and activity behavior along with appropriate food choice as the main foundation for weight loss with surgery providing surgical reinforcement of the appropriate behavior set.    ASSESSMENT/PLAN: 22 y.o. Female here for pre bariatric surgery visit. She would like to restart the surgery process and plan for sleeve gastrectomy.     Tasklist updated  Start phentermine 18.75mg  Check Blood pressure and heart rate in 1 week   Repeat labs in [place]  Hematology clearance  Letter from PCP done  Schedule with Orquidea in 2 months for PBS  Schedule with dietitian in 1 month  Schedule psych evaluation   Letter from therapist support for bariatric surgery  IVC filter removal per PCP    Review of general surgery weight loss process    1. Complete preoperative requirements, including weight loss.  Final weight check to confirm MANDATORY weight loss requirement must be documented on a clinic scale.    2. Discuss prior authorization with .    3. History and physical evaluation by PCP of PAC clinic within 30 days of surgery date, preoperative class, and weight check (weigh-in visit) to be scheduled by patient.  Pre-anesthesia clinic for risk evaluation to be scheduled by anesthesia clinic.    4. We cannot guarantee that patient will qualify for surgery unless all preoperative requirements are met, prior authorization from primary insurance company is granted, and insurance changes do not occur.    5. It is possible for patients to regain all " weight after weight loss surgery unless they follow guidelines prescribed by our bariatric center.    6. All patients with gastrointestinal complaints after weight loss surgery must have complaints conveyed to the bariatric team for appropriate treatment.    7. Vitamin deficiencies may develop post-bariatric surgery and annual laboratory testing should be performed.    8. Persistent nausea/vomiting after bariatric surgery entails risk of thiamine deficiency and should be treated early.  Vitamin B12 deficiency may develop, especially after gastric bypass surgery and must be recognized.        If you have any questions about our plans please don't hesitate to contact me.      I spent a total of 15 minutes face to face with Graciela Christianson during today's office visit.  Over 50% of this time was spent counseling the patient and/or coordinating care.        Again, thank you for allowing me to participate in the care of your patient.      Sincerely,    Orquidea Hyman PA-C

## 2018-06-11 NOTE — PROGRESS NOTES
"Bariatric Nutrition Consultation Note    Reason For Visit: Nutrition Reassessment    Graciela Christianson is a 22- year-old presenting today for a follow-up bariatric nutrition consult.  Pt is interested in laparoscopic sleeve gastrectomy.  Patient is accompanied by friend.  This is pt's first of 6 required (pt is re-starting the process) nutrition visits prior to surgery. Pt referred by Orquidea Hyman PA-C (6/11/18).     She is interested in having weight loss surgery for the following reasons:  To improve overall health and wellbeing (possible sleep issue).     Support System Reviewed With Patient 7/24/2017   Who do you have in your support network that can be available to help you for the first 2 weeks after surgery? my fiancé and my mother   Who can you count on for support throughout your weight loss surgery journey? my fiancé and my mother       ANTHROPOMETRICS:    Height as of an earlier encounter on 8/2/17: 1.727 m (5' 8\").    Weight as of an earlier encounter on 8/2/17: 167.4 kg (369 lb) with BMI of 56.11.  Current Weight: 368.4 lbs (-0.6 lbs since initial weight)    Required weight loss goal pre-op: -36 lbs from initial consult weight (goal weight 333 lbs or less before surgery)    SUPPLEMENT INFORMATION:  Multivitamin, vitamin B12    NUTRITION HISTORY:    Progress Towards Previous Goals:  Eat slowly (20-30 minutes per meal), chewing foods well (25 chews per bite/applesauce consistency). - Continues. Pt has not been working on this goal.   Focus on lean protein (skinless turkey/chicken breast, eggs, plain Greek yogurt, pork chop without rim of fat, etc) and non-starchy vegetables (green beans, celery, cucumber, carrots, broccoli, cauliflower, salad)/whole fruit at each meal. - Met and continues. Pt is following the Beach Body diet but has occasions where she snacks on junk food (usually on days when she skips breakfast).   Reduce caffeine/carbonation/calorie containing beverages - Improving. Pt has stared " drinking soda in the last week due to cravings.   Separate fluids from meals by 30 minutes before, during, and after eating - Continues.   Start a multivitamin containing iron daily - Met.   Increase activity level.  Exercise 2-3 days/week for 20 minutes - Met and continues. Pt is walking dog, fit bit,  for 5K,  owns a gym.   Practice alternatives to emotion eating (try making a list). - Met and continues.   Rid your environment of baked goods and other sweet triggers. - Continues.    Diet Recall (6/11/18):  Breakfast: 4 days/week will eat breakfast; 2 eggs, 1/2 orange, 1/2 avocado  Lunch: Grilled Chicken with salad (handful shredded cheese, 1/4 cup sunflower seeds)  Snack: Junk food (typically on days she skips breakfast)  Dinner: Chicken, vegetables  Snack: Junk food (typically on days she skips breakfast)  Beverages: Water, (recently started drinking soda again)    *Pt is willing to abstain from alcohol after surgery.     Eating Habits 7/24/2017   Do you have any dietary restrictions? No Food Allergies Known  Mild Intolerance to Lactose    Do you currently binge eat (eat a large amount of food in a short time)? Yes   Are you an emotional eater? Yes   Do you get up to eat after falling asleep? No   What foods do you crave? Sugar beverages; pastries/donuts       NUTRITION DIAGNOSIS:  Obesity r/t long history of self-monitoring deficit and excessive energy intake aeb BMI >30. - Continues, improving.     INTERVENTION:  Intervention Provided/Education Provided: Reviewed goals. Praised pt for progress with goals. Discussed not skipping breakfast to help regulate blood glucose levels and decrease snack cravings. Discussed protein shakes for quick meal options. Gave encouragement and support.  Provided pt with list of goals and RD contact information.      Questions Reviewed With Patient 7/24/2017   How ready are you to make changes regarding your weight? Number 1 = Not ready at all to make changes up to 10 =  very ready. 10   How confident are you that you can change? 1 = Not confident that you will be successful making changes up to 10 = very confident. 8       Patient Understanding: good  Expected Compliance: good    GOALS:  Relating To Eating:  -Eat slowly (20-30 minutes per meal), chewing foods well (25 chews per bite/applesauce consistency).  -Focus on lean protein (skinless turkey/chicken breast, eggs, plain Greek yogurt, pork chop without rim of fat, etc) and non-starchy vegetables (green beans, celery, cucumber, carrots, broccoli, cauliflower, salad)/whole fruit at each meal.  - Try a protein shake in the morning:    *Protein Shake Criteria: no more than 250 Calories, at least 20 grams of protein, and less than 10 grams of sugar     Meal Replacement Shake Options:   Premier Protein (160 Calories, 30 g protein)  Slim Fast Advanced Nutrition (180 Calories, 20 g protein)  Muscle Milk, lactose-free, 17 oz bottle (210 Calories, 30 g protein)  Integrated Supplements, no artificial sugars (110 Calories, 20 g protein)    Relating to beverages:  -Reduce caffeine/carbonation/calorie containing beverages.  -Separate fluids from meals by 30 minutes before, during, and after eating.    Relating to dietary supplements:  -Continue taking multivitamin containing iron daily.    Relating to activity:  -Increase activity level.  Exercise 2-3 days/week for 20 minutes.    Relating to cravings:  -Practice alternatives to emotion eating (try making a list). Try not to skip breakfast.   -Rid your environment of baked goods and other sweet triggers.      Follow-Up: 1 month    Time spent with patient: 45 minutes.    Meche Richardson RD, LD  Pager: [phone]

## 2018-06-11 NOTE — PATIENT INSTRUCTIONS
MEDICATION STARTED AT THIS APPOINTMENT    We are starting Phentermine. Take half tablet in the morning.  Call the nurse at [phone] if you have any questions or concerns. (Do not stop taking it if you don't think it's working. For some people it works without them knowing it.)    Phentermine is being prescribed because you identified hunger as one of the main causes for your extra weight.      Our patients on Phentermine find that they:    >feel less hunger    >find it easier to push the plate away   >have an easier time eating less    For some of our patients, these feelings are very real and immediate. For other patients, the feelings are less obvious. They don't feel much of a change but find they've lost weight. Like all weight loss medications, Phentermine  works best when you help it work. This means:  1. Having less tempting high calorie (fattening) food around the house or office. (For people with strong cravings this is very important.)   2. Staying away from situations or people that may trigger your cravings .   3. Eating out only one time or less each week.  4. Eating your meals at a table with the TV or computer off.    Side-effects. Phentermine is generally well tolerated. The main side-effects we see are feelings of racing pulse or rapid heart beat. Some people can get an elevated blood pressure. Because of this we may have you come back within a week or so of starting the medication for a blood pressure check.         In order to get refills of this or any medication we prescribe you must be seen in the medical weight mgmt clinic every 2-3 months. Please have your pharmacy fax a refill request to [phone].

## 2018-06-11 NOTE — MR AVS SNAPSHOT
After Visit Summary   6/11/2018    Graciela Christianson    MRN: [id number]           Patient Information     Date Of Birth          1995        Visit Information        Provider Department      6/11/2018 7:30 AM Orquidea Hyman PA-C [facility] Surgical Weight Management        Today's Diagnoses     Hx pulmonary embolism    -  1    Morbid obesity (H)          Care Instructions      MEDICATION STARTED AT THIS APPOINTMENT    We are starting Phentermine. Take half tablet in the morning.  Call the nurse at [phone] if you have any questions or concerns. (Do not stop taking it if you don't think it's working. For some people it works without them knowing it.)    Phentermine is being prescribed because you identified hunger as one of the main causes for your extra weight.      Our patients on Phentermine find that they:    >feel less hunger    >find it easier to push the plate away   >have an easier time eating less    For some of our patients, these feelings are very real and immediate. For other patients, the feelings are less obvious. They don't feel much of a change but find they've lost weight. Like all weight loss medications, Phentermine  works best when you help it work. This means:  1. Having less tempting high calorie (fattening) food around the house or office. (For people with strong cravings this is very important.)   2. Staying away from situations or people that may trigger your cravings .   3. Eating out only one time or less each week.  4. Eating your meals at a table with the TV or computer off.    Side-effects. Phentermine is generally well tolerated. The main side-effects we see are feelings of racing pulse or rapid heart beat. Some people can get an elevated blood pressure. Because of this we may have you come back within a week or so of starting the medication for a blood pressure check.         In order to get refills of this or any medication we prescribe you must be seen  in the medical weight mgmt clinic every 2-3 months. Please have your pharmacy fax a refill request to [phone].              Follow-ups after your visit        Additional Services     ONC/HEME ADULT REFERRAL       Your provider has referred you to: [facility] [place] [facility] [phone]  [email or web address]    Please be aware that coverage of these services is subject to the terms and limitations of your health insurance plan.  Call member services at your health plan with any benefit or coverage questions.      Please bring the following with you to your appointment:    (1) Any X-Rays, CTs or MRIs which have been performed.  Contact the facility where they were done to arrange for  prior to your scheduled appointment.   (2) List of current medications  (3) This referral request   (4) Any documents/labs given to you for this referral                  Follow-up notes from your care team     Return in about 2 months (around 8/11/2018).      Future tests that were ordered for you today     Open Future Orders        Priority Expected Expires Ordered    CBC with platelets Routine 6/11/2018 9/11/2018 6/11/2018    Comprehensive metabolic panel Routine 6/11/2018 9/11/2018 6/11/2018    Hemoglobin A1c Routine 6/11/2018 9/11/2018 6/11/2018    Parathyroid Hormone Intact Routine 6/11/2018 9/11/2018 6/11/2018    Vitamin D Deficiency Routine 6/11/2018 9/11/2018 6/11/2018    CBC with platelets Routine  6/11/2019 6/11/2018    Comprehensive metabolic panel Routine  6/11/2019 6/11/2018    Hemoglobin A1c Routine  9/9/2018 6/11/2018    Vitamin D Deficiency Routine  6/11/2019 6/11/2018    Parathyroid Hormone Intact Routine  6/11/2019 6/11/2018            Who to contact     Please call your clinic at [phone] to:    Ask questions about your health    Make or cancel appointments    Discuss your medicines    Learn about your  "test results    Speak to your doctor            Additional Information About Your Visit        [employer] Information     [employer] gives you secure access to your electronic health record. If you see a primary care provider, you can also send messages to your care team and make appointments. If you have questions, please call your primary care clinic.  If you do not have a primary care provider, please call [phone] and they will assist you.      [employer] is an electronic gateway that provides easy, online access to your medical records. With [employer], you can request a clinic appointment, read your test results, renew a prescription or communicate with your care team.     To access your existing account, please contact your [facility] or call [phone] for assistance.        Care EveryWhere ID     This is your Care EveryWhere ID. This could be used by other organizations to access your [place] medical records  [id number]        Your Vitals Were     Pulse Temperature Height Pulse Oximetry BMI (Body Mass Index)       74 98.3  F (36.8  C) (Oral) 5' 8\" 98% 56.02 kg/m2        Blood Pressure from Last 3 Encounters:   06/11/18 132/74   08/02/17 135/83    Weight from Last 3 Encounters:   06/11/18 (!) 368 lb 6.4 oz   08/02/17 (!) 369 lb              We Performed the Following     ONC/HEME ADULT REFERRAL          Today's Medication Changes          These changes are accurate as of 6/11/18 10:02 AM.  If you have any questions, ask your nurse or doctor.               Start taking these medicines.        Dose/Directions    phentermine 37.5 MG tablet   Commonly known as:  ADIPEX-P   Used for:  Morbid obesity (H)   Started by:  Orquidea Hyman PA-C        Dose:  0.5 tablet   Take 0.5 tablets (18.75 mg) by mouth every morning   Quantity:  15 tablet   Refills:  1            Where to get your medicines      Some of these will need a paper prescription and others can be bought over the " counter.  Ask your nurse if you have questions.     Bring a paper prescription for each of these medications     phentermine 37.5 MG tablet                Primary Care Provider Office Phone # Fax #    Jorge Luis Kirkland [phone]       [address] [address]        Equal Access to Services     BRYN GAMBINO : Hadii aad ku hadasho Soomaali, waaxda luqadaha, qaybta kaalmada adeegyada, waxay tylerin hayaniln urbano aldana. So [facility] [phone].    ATENCIÓN: Si habla español, tiene a veronica disposición servicios gratuitos de asistencia lingüística. [facility] [phone].    We comply with applicable federal civil rights laws and [place] laws. We do not discriminate on the basis of race, color, national origin, age, disability, sex, sexual orientation, or gender identity.            Thank you!     Thank you for choosing [facility] SURGICAL WEIGHT MANAGEMENT  for your care. Our goal is always to provide you with excellent care. Hearing back from our patients is one way we can continue to improve our services. Please take a few minutes to complete the written survey that you may receive in the mail after your visit with us. Thank you!             Your Updated Medication List - Protect others around you: Learn how to safely use, store and throw away your medicines at www.disposemymeds.org.          This list is accurate as of 6/11/18 10:02 AM.  Always use your most recent med list.                   Brand Name Dispense Instructions for use Diagnosis    ibuprofen 200 MG tablet    ADVIL/MOTRIN     Take 2 tablets by mouth        MULTI-VITAMINS Tabs      Take 1 tablet by mouth        phentermine 37.5 MG tablet    ADIPEX-P    15 tablet    Take 0.5 tablets (18.75 mg) by mouth every morning    Morbid obesity (H)

## 2018-06-11 NOTE — NURSING NOTE
"(   Chief Complaint   Patient presents with     RECHECK     PBS    )    ( Weight: (!) 368 lb 6.4 oz )  ( Height: 5' 8\" )  ( BMI (Calculated): 56.13 )  ( Initial Weight: 369 lb )  ( Cumulative weight loss (lbs): 0.6 )  ( Last Visits Weight: 369 lb )  ( Wt change since last visit (lbs): -0.6 )  (   )  (   )    ( BP: 132/74 )  (   )  ( Temp: 98.3  F (36.8  C) )  ( Temp src: Oral )  ( Pulse: 74 )  (   )  ( SpO2: 98 % )    (   Patient Active Problem List   Diagnosis     Morbid obesity (H)    )  (   Current Outpatient Prescriptions   Medication Sig Dispense Refill     ibuprofen (ADVIL/MOTRIN) 200 MG tablet Take 2 tablets by mouth       Multiple Vitamin (MULTI-VITAMINS) TABS Take 1 tablet by mouth      )  ( Diabetes Eval:    )    ( Pain Eval:  Data Unavailable )    ( Wound Eval:       )    (   History   Smoking Status     Never Smoker   Smokeless Tobacco     Never Used    )    ( Signed By:  Digna Valdes; June 11, 2018; 7:53 AM )    "

## 2018-06-11 NOTE — PROGRESS NOTES
"Pre-Bariatric Surgery Note    Jorge Luis Kirkland    Date: 2018     RE: Graciela Christianson    MR#: [id number]   : 1995   Date of Visit: 2018    REASON FOR VISIT: Preoperative evaluation for possible weight loss surgery    Dear Jorge Luis Tinsley,    I had the pleasure of seeing your patient, Graciela Christianson, in my preoperative bariatric clinic.    As you know, she is morbidly obese and considering weight loss surgery to treat obesity in association with her medical conditions of obesity.  Her consult weight was 369.  She has lost 1 pounds since her consult weight. She has not met her required pre-surgery weight.    NBS visit with me 17 and patient decided against surgery. She would like to restart the process and feels ready now.    Most recent weights:  Wt Readings from Last 4 Encounters:   18 (!) 368 lb 6.4 oz   17 (!) 369 lb       Please refer to initial consult note from date 17 for patient's weight history and co-morbidities.    ROS    Past Medical History:   Diagnosis Date     Bone and joint disord back, pelvis, leg complicat preg, childb, puerp     hip issues when was 10     Depressive disorder     after baby depression     Gallbladder problem 2016    gallbladder was removed       Past Surgical History:   Procedure Laterality Date     CHOLECYSTECTOMY         Current Outpatient Prescriptions   Medication     ibuprofen (ADVIL/MOTRIN) 200 MG tablet     Multiple Vitamin (MULTI-VITAMINS) TABS     No current facility-administered medications for this visit.        Allergies   Allergen Reactions     Lactose      Other reaction(s): GI intolerance  Cerner list Epigastric pain  Other reaction(s): Other - Describe In Comment Field  Epigastric pain     No Clinical Screening - See Comments      Other reaction(s): Other (see comments)  Cerner list no reaction       PHYSICAL EXAMINATION:  /74  Pulse 74  Temp 98.3  F (36.8  C) (Oral)  Ht 5' 8\"  Wt (!) 368 " lb 6.4 oz  SpO2 98%  BMI 56.02 kg/m2   Body mass index is 56.02 kg/(m^2).   NAD NCAT  Respiratory: breathing unlabored  Abdomen: obese, soft/nt/nd      I emphasized exercise and activity behavior along with appropriate food choice as the main foundation for weight loss with surgery providing surgical reinforcement of the appropriate behavior set.    ASSESSMENT/PLAN: 22 y.o. Female here for pre bariatric surgery visit. She would like to restart the surgery process and plan for sleeve gastrectomy.     Tasklist updated  Start phentermine 18.75mg  Check Blood pressure and heart rate in 1 week   Repeat labs in [place]  Hematology clearance  Letter from PCP done  Schedule with Orquidea in 2 months for PBS  Schedule with dietitian in 1 month  Schedule psych evaluation   Letter from therapist support for bariatric surgery  IVC filter removal per PCP    Review of general surgery weight loss process    1. Complete preoperative requirements, including weight loss.  Final weight check to confirm MANDATORY weight loss requirement must be documented on a clinic scale.    2. Discuss prior authorization with .    3. History and physical evaluation by PCP of PAC clinic within 30 days of surgery date, preoperative class, and weight check (weigh-in visit) to be scheduled by patient.  Pre-anesthesia clinic for risk evaluation to be scheduled by anesthesia clinic.    4. We cannot guarantee that patient will qualify for surgery unless all preoperative requirements are met, prior authorization from primary insurance company is granted, and insurance changes do not occur.    5. It is possible for patients to regain all weight after weight loss surgery unless they follow guidelines prescribed by our bariatric center.    6. All patients with gastrointestinal complaints after weight loss surgery must have complaints conveyed to the bariatric team for appropriate treatment.    7. Vitamin deficiencies may develop  post-bariatric surgery and annual laboratory testing should be performed.    8. Persistent nausea/vomiting after bariatric surgery entails risk of thiamine deficiency and should be treated early.  Vitamin B12 deficiency may develop, especially after gastric bypass surgery and must be recognized.        If you have any questions about our plans please don't hesitate to contact me.    Sincerely,    Orquidea Hyman PA-C    I spent a total of 15 minutes face to face with Graciela Christianson during today's office visit.  Over 50% of this time was spent counseling the patient and/or coordinating care.

## 2018-06-11 NOTE — MR AVS SNAPSHOT
MRN:[id number]                      After Visit Summary   6/11/2018    Graciela Christianson    MRN: [id number]           Visit Information        Provider Department      6/11/2018 7:00 AM Meche Richardson RD [facility] Surgical Weight Management        Care Instructions    GOALS:  Relating To Eating:  -Eat slowly (20-30 minutes per meal), chewing foods well (25 chews per bite/applesauce consistency).  -Focus on lean protein (skinless turkey/chicken breast, eggs, plain Greek yogurt, pork chop without rim of fat, etc) and non-starchy vegetables (green beans, celery, cucumber, carrots, broccoli, cauliflower, salad)/whole fruit at each meal.  - Try a protein shake in the morning:    *Protein Shake Criteria: no more than 250 Calories, at least 20 grams of protein, and less than 10 grams of sugar     Meal Replacement Shake Options:   Premier Protein (160 Calories, 30 g protein)  Slim Fast Advanced Nutrition (180 Calories, 20 g protein)  Muscle Milk, lactose-free, 17 oz bottle (210 Calories, 30 g protein)  Integrated Supplements, no artificial sugars (110 Calories, 20 g protein)    Relating to beverages:  -Reduce caffeine/carbonation/calorie containing beverages.  -Separate fluids from meals by 30 minutes before, during, and after eating.    Relating to dietary supplements:  -Continue taking multivitamin containing iron daily.    Relating to activity:  -Increase activity level.  Exercise 2-3 days/week for 20 minutes.    Relating to cravings:  -Practice alternatives to emotion eating (try making a list). Try not to skip breakfast.   -Rid your environment of baked goods and other sweet triggers.      Kamille Richardson RD, LD  Voicemail: [phone]  Appointments: [phone]           [employer] Information     [employer] gives you secure access to your electronic health record. If you see a primary care provider, you can also send messages to your care team and make appointments. If you have questions, please call  your primary care clinic.  If you do not have a primary care provider, please call [phone] and they will assist you.      [employer] is an electronic gateway that provides easy, online access to your medical records. With [employer], you can request a clinic appointment, read your test results, renew a prescription or communicate with your care team.     To access your existing account, please contact your [facility] or call [phone] for assistance.        Care EveryWhere ID     This is your Care EveryWhere ID. This could be used by other organizations to access your [place] medical records  [id number]        Equal Access to Services     BRYN GAMBINO : Leslie Gaxiola, maryann dawson, ramana gillespie, ericka aldana. So [facility] [phone].    ATENCIÓN: Si habla español, tiene a veronica disposición servicios gratuitos de asistencia lingüística. Llame al [phone].    We comply with applicable federal civil rights laws and [place] laws. We do not discriminate on the basis of race, color, national origin, age, disability, sex, sexual orientation, or gender identity.

## 2018-07-09 ENCOUNTER — ALLIED HEALTH/NURSE VISIT (OUTPATIENT)
Dept: SURGERY | Facility: CLINIC | Age: 23
End: 2018-07-09
Payer: COMMERCIAL

## 2018-07-09 VITALS — BODY MASS INDEX: 53.54 KG/M2 | WEIGHT: 293 LBS

## 2018-07-09 NOTE — MR AVS SNAPSHOT
MRN:[id number]                      After Visit Summary   7/9/2018    Graciela Christianson    MRN: [id number]           Visit Information        Provider Department      7/9/2018 10:00 AM Meche Richardson RD [facility] Surgical Weight Management        Your next 10 appointments already scheduled     Aug 10, 2018  7:45 AM CDT   (Arrive by 7:30 AM)   Pre Bariatric Surgery with Orquidea Hyman PA-C   [facility] Surgical Weight Management ([facility])    [address] [place] [id number]   [phone]            Aug 14, 2018 11:30 AM CDT   (Arrive by 11:15 AM)   NUTRITION VISIT with Ade Hernandez RD   [facility] Surgical Weight Management ([facility])    [address] [place] [id number]   [phone]            Aug 14, 2018  1:00 PM CDT   (Arrive by 12:45 PM)   PSYCH EVALUATION with Sandra Lopez, PhD [facility] Neuropsychology ([facility])    [address] [place] [id number]   [phone]              Care Instructions    GOALS:  Relating To Eating:  -Eat slowly (20-30 minutes per meal), chewing foods well (25 chews per bite/applesauce consistency).  -Focus on lean protein (skinless turkey/chicken breast, eggs, plain Greek yogurt, pork chop without rim of fat, etc) and non-starchy vegetables (green beans, celery, cucumber, carrots, broccoli, cauliflower, salad)/whole fruit at each meal.    Relating to beverages:  -Reduce caffeine/carbonation/calorie containing beverages.  -Separate fluids from meals by 30 minutes before, during, and after eating.    Relating to dietary supplements:  -Continue taking multivitamin containing iron daily.    Relating to activity:  -Increase activity level.  Exercise 2-3 days/week for 20 minutes.    Relating to cravings:  -Practice alternatives to emotion eating (stay busy with kids and go to bed  early). Try not to skip breakfast.   -Rid your environment of baked goods and other sweet triggers.    - Try to limit weight check to 1x/day or less.     Kamille Richardson, ZOYA, LD  Voicemail: [phone]  Appointments: [phone]           [employer] Information     [employer] gives you secure access to your electronic health record. If you see a primary care provider, you can also send messages to your care team and make appointments. If you have questions, please call your primary care clinic.  If you do not have a primary care provider, please call [phone] and they will assist you.      [employer] is an electronic gateway that provides easy, online access to your medical records. With [employer], you can request a clinic appointment, read your test results, renew a prescription or communicate with your care team.     To access your existing account, please contact your [facility] or call [phone] for assistance.        Care EveryWhere ID     This is your Care EveryWhere ID. This could be used by other organizations to access your [place] medical records  [id number]        Equal Access to Services     BRYN GAMBINO : Hadpancho Gaxiola, waprince dawson, qakhoi gillespie, ericka aldana. So [facility] [phone].    ATENCIÓN: Si habla español, tiene a veronica disposición servicios gratuitos de asistencia lingüística. Llame al [phone].    We comply with applicable federal civil rights laws and [place] laws. We do not discriminate on the basis of race, color, national origin, age, disability, sex, sexual orientation, or gender identity.

## 2018-07-09 NOTE — PROGRESS NOTES
"Bariatric Nutrition Consultation Note    Reason For Visit: Nutrition Reassessment    Graciela Christianson is a 22- year-old presenting today for a follow-up bariatric nutrition consult.  Pt is interested in laparoscopic sleeve gastrectomy.  Patient is accompanied by friend.  This is pt's 2 of 3 required (pt is re-starting the process) nutrition visits prior to surgery. Pt referred by Orquidea Hyman PA-C (6/11/18).     She is interested in having weight loss surgery for the following reasons:  To improve overall health and wellbeing (possible sleep issue).     Support System Reviewed With Patient 7/24/2017   Who do you have in your support network that can be available to help you for the first 2 weeks after surgery? my fiancé and my mother   Who can you count on for support throughout your weight loss surgery journey? my fiancé and my mother       ANTHROPOMETRICS:    Height as of an earlier encounter on 8/2/17: 1.727 m (5' 8\").    Weight as of an earlier encounter on 8/2/17: 167.4 kg (369 lb) with BMI of 56.11.  Current Weight: 352.1 lbs (-16.3 lbs since last month; -16.9 lbs since initial weight)    Required weight loss goal pre-op: -36 lbs from initial consult weight (goal weight 333 lbs or less before surgery)    SUPPLEMENT INFORMATION:  Multivitamin, vitamin B12    NUTRITION HISTORY:    (7/9/18): Pt stated she is finding herself getting obsessed with the number on the scale and she is weighing herself 3x/day. She is really frustrated because her weight has plateaued over the last week.     Progress Towards Previous Goals:  -Eat slowly (20-30 minutes per meal), chewing foods well (25 chews per bite/applesauce consistency). - Improving. Pt is working on eating slowly and chewing well.   -Focus on lean protein (skinless turkey/chicken breast, eggs, plain Greek yogurt, pork chop without rim of fat, etc) and non-starchy vegetables (green beans, celery, cucumber, carrots, broccoli, cauliflower, salad)/whole fruit at each " meal. - Met and continues.   - Try a protein shake in the morning: - Met. Pt tried a protein shake for breakfast but did not like them (tried several different kinds).   -Reduce caffeine/carbonation/calorie containing beverages. - Met and continues.   -Separate fluids from meals by 30 minutes before, during, and after eating. - Improving. Pt is  fluids from meals about 45% of the time.   -Continue taking multivitamin containing iron daily. - Met and continues.   -Increase activity level.  Exercise 2-3 days/week for 20 minutes. - Met and continues. Pt is walking the dog.  -Practice alternatives to emotion eating (try making a list). Try not to skip breakfast. - Met and continues. Pt is busy with kids and going to bed earlier when feeling hungry.   -Rid your environment of baked goods and other sweet triggers.  - Met.    Diet Recall (6/11/18):  Breakfast: 4 days/week will eat breakfast; 2 eggs, 1/2 orange, 1/2 avocado  Lunch: Grilled Chicken with salad (handful shredded cheese, 1/4 cup sunflower seeds)  Snack: Junk food (typically on days she skips breakfast)  Dinner: Chicken, vegetables  Snack: Junk food (typically on days she skips breakfast)  Beverages: Water, (recently started drinking soda again)    *Pt is willing to abstain from alcohol after surgery.     Eating Habits 7/24/2017   Do you have any dietary restrictions? No Food Allergies Known  Mild Intolerance to Lactose    Do you currently binge eat (eat a large amount of food in a short time)? Yes   Are you an emotional eater? Yes   Do you get up to eat after falling asleep? No   What foods do you crave? Sugar beverages; pastries/donuts       NUTRITION DIAGNOSIS:  Obesity r/t long history of self-monitoring deficit and excessive energy intake aeb BMI >30. - Continues, improving.     INTERVENTION:  Intervention Provided/Education Provided: Reviewed goals. Praised pt for progress with goals and weight loss. Encouraged pt to continue with goals and try  to worry less about the number on the scale. Gave encouragement and support.  Provided pt with list of goals and RD contact information.      Questions Reviewed With Patient 7/24/2017   How ready are you to make changes regarding your weight? Number 1 = Not ready at all to make changes up to 10 = very ready. 10   How confident are you that you can change? 1 = Not confident that you will be successful making changes up to 10 = very confident. 8       Patient Understanding: good  Expected Compliance: good    GOALS:  Relating To Eating:  -Eat slowly (20-30 minutes per meal), chewing foods well (25 chews per bite/applesauce consistency).  -Focus on lean protein (skinless turkey/chicken breast, eggs, plain Greek yogurt, pork chop without rim of fat, etc) and non-starchy vegetables (green beans, celery, cucumber, carrots, broccoli, cauliflower, salad)/whole fruit at each meal.    Relating to beverages:  -Reduce caffeine/carbonation/calorie containing beverages.  -Separate fluids from meals by 30 minutes before, during, and after eating.    Relating to dietary supplements:  -Continue taking multivitamin containing iron daily.    Relating to activity:  -Increase activity level.  Exercise 2-3 days/week for 20 minutes.    Relating to cravings:  -Practice alternatives to emotion eating (stay busy with kids and go to bed early). Try not to skip breakfast.   -Rid your environment of baked goods and other sweet triggers.    - Try to limit weight check to 1x/day or less.     Follow-Up: 1 month    Time spent with patient: 30 minutes.    Meche Richardson RD, LD  Pager: [phone]

## 2018-07-09 NOTE — PATIENT INSTRUCTIONS
GOALS:  Relating To Eating:  -Eat slowly (20-30 minutes per meal), chewing foods well (25 chews per bite/applesauce consistency).  -Focus on lean protein (skinless turkey/chicken breast, eggs, plain Greek yogurt, pork chop without rim of fat, etc) and non-starchy vegetables (green beans, celery, cucumber, carrots, broccoli, cauliflower, salad)/whole fruit at each meal.    Relating to beverages:  -Reduce caffeine/carbonation/calorie containing beverages.  -Separate fluids from meals by 30 minutes before, during, and after eating.    Relating to dietary supplements:  -Continue taking multivitamin containing iron daily.    Relating to activity:  -Increase activity level.  Exercise 2-3 days/week for 20 minutes.    Relating to cravings:  -Practice alternatives to emotion eating (stay busy with kids and go to bed early). Try not to skip breakfast.   -Rid your environment of baked goods and other sweet triggers.    - Try to limit weight check to 1x/day or less.     Kamille Richardson RD, LD  Voicemail: [phone]  Appointments: [phone]

## 2018-07-13 ENCOUNTER — MYC MEDICAL ADVICE (OUTPATIENT)
Dept: SURGERY | Facility: CLINIC | Age: 23
End: 2018-07-13

## 2018-07-17 RX ORDER — TOPIRAMATE 25 MG/1
50 TABLET, FILM COATED ORAL AT BEDTIME
Qty: 60 TABLET | Refills: 3 | COMMUNITY
Start: 2018-07-17 | End: 2018-08-10

## 2018-07-17 NOTE — TELEPHONE ENCOUNTER
Per Orquidea Hyman, will call in new rx for Topiramate ramp to 50mg. Patient was given info via [employer], has no questions at this time.

## 2018-07-26 ENCOUNTER — CARE COORDINATION (OUTPATIENT)
Dept: SURGERY | Facility: CLINIC | Age: 23
End: 2018-07-26

## 2018-07-26 NOTE — PROGRESS NOTES
"Tasklist updated.    Bariatric Task List  Status:  Is patient a candidate for bariatric surgery?:    -    Cleared to schedule surgeon consult?:    -     Status:  surgery evaluation in process -     Surgeon: Dr Hopper -     Tentative surgery month/year: Sept or October 2018 -        Insurance: Insurance:  [employer] -     Other:  Need 3 months of RD visits for [facility] [employer]. -        Patient Info: Initial Weight:  369 -     Date of Initial Weight/Height:  8/2/2017 -     Goal Weight (lbs):  333 -     Required Weight Loss:  36 -     Surgery Type:  sleeve gastrectomy -        Dietician Visits: Structured weight loss required by insurance?:  Yes -     Dietician Visit 1:  Completed - 6/11/18   Dietician Visit 2:  Completed - 7/9/18: Completed - [place]   Dietician Visit 3:  Needed - 8/14/18 appt   Dietician Visit additional:    -  Can be done monthly until at goal weight.      Psychological Evaluation: Psych eval:  Needed - 8/14/18 Dr Lopez appt   Therapist letter of support:  Needed - To be faxed to [phone], attn: CM Neal      Lab Work: Complete Blood Count:  Completed - 8/2/17   Comprehensive Metabolic Panel:  Completed - 8/2/17   Vitamin D:  Needed - 8/2/17 please recheck when PTH level is rechecked.   Hgb A1c:  Completed - 8/2/17   PTH:  Needed - 8/2/17= 123, please recheck.      Consults/ Clearance: Hematology:  Needed - History of PE and IVC filter.   Other  Needed - get IVC filter removed   Sleep medicine: obtain clearance   Medical weight management - ongoing.         PCP: Establish care with PCP:  Completed -     Follow up with PCP:    -     PCP letter of support:  Completed - 8/29/17 Jorge Luis Kirkland. [facility]      Patient Education:  Information Session:  Completed -     Given \"Making your decision\" handout?:  Yes -     Given support group information?:  Yes -     Attended support group?:    -     Support plan in place?:  Completed -     Research consents signed?:  Yes -        Final Tasks:  Before surgery " online class:  Needed -     Before surgery online class website link:  [email or web address]   After surgery online class:  Needed -     After surgery online class website link:  [email or web address]   Nurse visit for weigh-in and information:  Needed -     Pre-assessment clinic visit with anesthesia team for H&P:  Needed -     Final labs (Hgb, plt, T&S, UA):  Needed -        Notes:   -

## 2018-07-27 ENCOUNTER — CARE COORDINATION (OUTPATIENT)
Dept: SURGERY | Facility: CLINIC | Age: 23
End: 2018-07-27

## 2018-07-27 DIAGNOSIS — E66.01 MORBID OBESITY (H): Primary | ICD-10-CM

## 2018-07-27 NOTE — PROGRESS NOTES
"Tasklist updated and reviewed with patient.    Bariatric Task List  Status:  Is patient a candidate for bariatric surgery?:    - Sept 2018   Cleared to schedule surgeon consult?:    -     Status:  surgery evaluation in process -     Surgeon: Dr Akins     Tentative surgery month/year: Sept or October 2018 -        Insurance: Insurance:  [employer] -     Other:  Need 3 months of RD visits for [employer]. -        Patient Info: Initial Weight:  369 -     Date of Initial Weight/Height:  8/2/2017 -     Goal Weight (lbs):  333 -     Required Weight Loss:  36 -     Surgery Type:  sleeve gastrectomy -        Dietician Visits: Structured weight loss required by insurance?:  Yes -     Dietician Visit 1:  Completed - 6/11/18   Dietician Visit 2:  Completed - 7/9/18: Completed - ND   Dietician Visit 3:  Needed - 8/14/18 appt      Psychological Evaluation: Psych eval:  Needed - 8/14/18 Dr Lopez appt   Therapist letter of support:  Needed - To be faxed to [phone], attn: CM Neal      Lab Work: Complete Blood Count:  Completed - 8/2/17   Comprehensive Metabolic Panel:  Completed - 8/2/17   Vitamin D:  Needed - 8/2/17 please recheck when PTH level is rechecked.   Hgb A1c:  Completed - 8/2/17   PTH:  Needed - 8/2/17= 123, please recheck.      Consults/ Clearance: Sleep Medicine:  Needed - Call [phone] to set up.   Hematology:  Needed - History of PE and IVC filter. Set up for end of August in [place].   Other  Needed - get IVC filter removed         PCP: Establish care with PCP:  Completed -     Follow up with PCP:    -     PCP letter of support:  Completed - 8/29/17 Jorge Luis Kirkland. s      Patient Education:  Information Session:  Completed -     Given \"Making your decision\" handout?:  Yes -     Given support group information?:  Yes -     Attended support group?:    -     Support plan in place?:  Completed -     Research consents signed?:  Yes -        Final Tasks:  Before surgery online class:  " Needed -     Before surgery online class website link:  [email or web address]   After surgery online class:  Needed -     After surgery online class website link:  [email or web address]   Nurse visit for weigh-in and information:  Needed -     Pre-assessment clinic visit with anesthesia team for H&P:  Needed -     Final labs (Hgb, plt, T&S, UA):  Needed -        Notes: 7/27/18 Informed of MRI (Cognitive) Study with Dr Rajendra Jimenez to call and provide further information. Email sent to Tony. -

## 2018-08-10 ENCOUNTER — OFFICE VISIT (OUTPATIENT)
Dept: SURGERY | Facility: CLINIC | Age: 23
End: 2018-08-10
Payer: COMMERCIAL

## 2018-08-10 VITALS
WEIGHT: 293 LBS | TEMPERATURE: 97.5 F | RESPIRATION RATE: 18 BRPM | OXYGEN SATURATION: 95 % | HEIGHT: 68 IN | HEART RATE: 74 BPM | BODY MASS INDEX: 44.41 KG/M2 | DIASTOLIC BLOOD PRESSURE: 68 MMHG | SYSTOLIC BLOOD PRESSURE: 138 MMHG

## 2018-08-10 DIAGNOSIS — E66.01 MORBID OBESITY (H): ICD-10-CM

## 2018-08-10 RX ORDER — PHENTERMINE HYDROCHLORIDE 37.5 MG/1
0.5 TABLET ORAL EVERY MORNING
Qty: 15 TABLET | Refills: 3 | Status: ON HOLD | OUTPATIENT
Start: 2018-08-10 | End: 2018-12-12

## 2018-08-10 NOTE — MR AVS SNAPSHOT
After Visit Summary   8/10/2018    Graciela Christianson    MRN: [id number]           Patient Information     Date Of Birth          1995        Visit Information        Provider Department      8/10/2018 7:45 AM Orquidea Hyman PA-C [facility] Surgical Weight Management        Today's Diagnoses     Morbid obesity (H)          Care Instructions    Psych eval is next Tuesday  IVC filter removed end of August  Hematology clearance end of August  Sleep study in 2 week  Dietitian visit next Tuesday (3/3)  Hoping to have surgery in Sept.  Call Dustin to schedule tentative surgery date. [phone]  See Dr Hopper for bariatric surgeon visit after psych eval complete              Follow-ups after your visit        Your next 10 appointments already scheduled     Aug 14, 2018  9:30 AM CDT   (Arrive by 9:15 AM)   NUTRITION VISIT with Ade Hernandez RD   [facility] Surgical Weight Management ([facility] [place])    [address] [place] [address] [phone]            Aug 14, 2018  1:00 PM CDT   (Arrive by 12:45 PM)   PSYCH EVALUATION with Sandar Lopez, PhD [facility] Neuro[facility]ology ([facility])    [address] [place] [address] [phone]            Aug 17, 2018  1:00 PM CDT   MyChart Sleep Medicine New with Hardy Lara MD   [facility] ([facility])    [address] [address] [id number] [phone]           You will receive a sleep questionnaire packet in the mail from your clinic prior to the scheduled visit with your sleep specialist. If you do not receive that packet within 10 days of the scheduled appointment time please call the clinic directly.  At your consultation visit, the provider will review your current symptoms to determine the most appropriate treatment and determine if a sleep test is  "appropriate. If it is the right choice for you, it will be determined if you can have a home sleep test or an in lab test. Prior to any of this happening, your insurance will have to be prior authorized if applicable.  ***If you have a sleep machine currently, please bring it with you to your appointment***              Who to contact     Please call your clinic at [phone] to:    Ask questions about your health    Make or cancel appointments    Discuss your medicines    Learn about your test results    Speak to your doctor            Additional Information About Your Visit        [facility] Information     [facility] gives you secure access to your electronic health record. If you see a primary care provider, you can also send messages to your care team and make appointments. If you have questions, please call your primary care clinic.  If you do not have a primary care provider, please call [phone] and they will assist you.      [facility] is an electronic gateway that provides easy, online access to your medical records. With [facility], you can request a clinic appointment, read your test results, renew a prescription or communicate with your care team.     To access your existing account, please contact your [facility] or call [phone] for assistance.        Care EveryWhere ID     This is your Care EveryWhere ID. This could be used by other organizations to access your [place] medical records  [id number]        Your Vitals Were     Pulse Temperature Respirations Height Pulse Oximetry BMI (Body Mass Index)    74 97.5  F (36.4  C) (Oral) 18 5' 8\" 95% 50.88 kg/m2       Blood Pressure from Last 3 Encounters:   08/10/18 138/68   06/11/18 132/74   08/02/17 135/83    Weight from Last 3 Encounters:   08/10/18 (!) 334 lb 9.6 oz   07/09/18 (!) 352 lb 1.6 oz   06/11/18 (!) 368 lb 6.4 oz              Today, you had the following     No orders found for display         Today's Medication " Changes          These changes are accurate as of 8/10/18  8:15 AM.  If you have any questions, ask your nurse or doctor.               Stop taking these medicines if you haven't already. Please contact your care team if you have questions.     topiramate 25 MG tablet   Commonly known as:  TOPAMAX   Stopped by:  Orquidea Hyman PA-C                Where to get your medicines      Some of these will need a paper prescription and others can be bought over the counter.  Ask your nurse if you have questions.     Bring a paper prescription for each of these medications     phentermine 37.5 MG tablet                Primary Care Provider Office Phone # Fax #    Jorge Luis SIN Stephaniedmitry [phone]       [place] [address]        Equal Access to Services     BRYN GAMBINO : Leslie mayer Sotonya, waaxda luqadaha, qaybta kaalmada adeegyada, ericka lozada . So [facility] [phone].    ATENCIÓN: Si habla español, tiene a veronica disposición servicios gratuitos de asistencia lingüística. Llame al [phone].    We comply with applicable federal civil rights laws and [place] laws. We do not discriminate on the basis of race, color, national origin, age, disability, sex, sexual orientation, or gender identity.            Thank you!     Thank you for choosing [facility] SURGICAL WEIGHT MANAGEMENT  for your care. Our goal is always to provide you with excellent care. Hearing back from our patients is one way we can continue to improve our services. Please take a few minutes to complete the written survey that you may receive in the mail after your visit with us. Thank you!             Your Updated Medication List - Protect others around you: Learn how to safely use, store and throw away your medicines at www.disposemymeds.org.          This list is accurate as of 8/10/18  8:15 AM.  Always use your most recent med list.                   Brand  Name Dispense Instructions for use Diagnosis    ibuprofen 200 MG tablet    ADVIL/MOTRIN     Take 2 tablets by mouth        MULTI-VITAMINS Tabs      Take 1 tablet by mouth        phentermine 37.5 MG tablet    ADIPEX-P    15 tablet    Take 0.5 tablets (18.75 mg) by mouth every morning    Morbid obesity (H)

## 2018-08-10 NOTE — LETTER
8/10/2018       RE: Graciela Christianson  [address] [address]     Dear Colleague,    Thank you for referring your patient, Graciela Christianson, to the [facility] SURGICAL WEIGHT MANAGEMENT at [facility]. Please see a copy of my visit note below.          Pre-Bariatric Surgery Note    Jorge Luis Kirkland    Date: 8/10/2018     RE: Graciela Christianson    MR#: [id number]   : 1995   Date of Visit: Aug 10, 2018    REASON FOR VISIT: Preoperative evaluation for possible weight loss surgery    Dear Jorge Luis Tinsley,    I had the pleasure of seeing your patient, Graciela Christianson, in my preoperative bariatric clinic.    As you know, she is morbidly obese and considering weight loss surgery to treat obesity in association with her medical conditions of obesity.  Her consult weight was 369.  She has lost 35 pounds since her consult weight. She has not met her required pre-surgery weight.    Most recent weights:  Wt Readings from Last 4 Encounters:   08/10/18 (!) 334 lb 9.6 oz   18 (!) 352 lb 1.6 oz   18 (!) 368 lb 6.4 oz   17 (!) 369 lb     NBS visit with me 17 and patient decided against surgery. Last visit with me 18 and she restarted the process.  She also started phentermine in  and added topiramate in July. She stopped taking topiramate due to nausea and diarrhea.  She ran out of phentermine 2 weeks ago.  She has lost 17 lbs. She would like to restart phentermine.    Please refer to initial consult note from date 17 for patient's weight history and co-morbidities.    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        Allergies   Allergen Reactions     Lactose Other (See Comments)     Epigastric pain  Other reaction(s): GI intolerance  Cerner list Epigastric pain  Other reaction(s): Other - Describe In Comment Field  Epigastric pain     No Clinical Screening - See Comments      Other reaction(s): Other (see comments)  Cerner list no reaction       PHYSICAL EXAMINATION:  /68 (BP Location: Left arm, Patient Position: Sitting, Cuff Size: Adult Large)  Pulse 74  Temp 97.5  F (36.4  C) (Oral)  Resp 18  Ht 5' 8\"  Wt (!) 334 lb 9.6 oz  SpO2 95%  BMI 50.88 kg/m2   Body mass index is 50.88 kg/(m^2).   NAD NCAT  Respiratory: breathing unlabored  Abdomen: obese, soft/nt/nd      I emphasized exercise and activity behavior along with appropriate food choice as the main foundation for weight loss with surgery providing surgical reinforcement of the appropriate behavior set.    PLAN:    Psych eval is next Tuesday  IVC filter removed end of August  Hematology clearance end of August  Sleep study in 2 week  Dietitian visit next Tuesday (3/3)  Hoping to have surgery in Sept.  Call Dustin to schedule tentative surgery date. [phone]  See Dr Hopper for bariatric surgeon visit after psych eval complete    Review of general surgery weight loss process    1. Complete preoperative requirements, including weight loss.  Final weight check to confirm MANDATORY weight loss requirement must be documented on a clinic scale.    2. Discuss prior authorization with .    3. History and physical evaluation by PCP of PAC clinic within 30 days of surgery date, preoperative class, and weight check (weigh-in visit) to be scheduled by patient.  Pre-anesthesia clinic for risk " evaluation to be scheduled by anesthesia clinic.    4. We cannot guarantee that patient will qualify for surgery unless all preoperative requirements are met, prior authorization from primary insurance company is granted, and insurance changes do not occur.    5. It is possible for patients to regain all weight after weight loss surgery unless they follow guidelines prescribed by our bariatric center.    6. All patients with gastrointestinal complaints after weight loss surgery must have complaints conveyed to the bariatric team for appropriate treatment.    7. Vitamin deficiencies may develop post-bariatric surgery and annual laboratory testing should be performed.    8. Persistent nausea/vomiting after bariatric surgery entails risk of thiamine deficiency and should be treated early.  Vitamin B12 deficiency may develop, especially after gastric bypass surgery and must be recognized.      If you have any questions about our plans please don't hesitate to contact me.      I spent a total of 15 minutes face to face with Graciela Christianson during today's office visit.  Over 50% of this time was spent counseling the patient and/or coordinating care.      Again, thank you for allowing me to participate in the care of your patient.      Sincerely,    Orquidea Hyman PA-C

## 2018-08-10 NOTE — PROGRESS NOTES
Pre-Bariatric Surgery Note    Jorge Luis Kirkland    Date: 8/10/2018     RE: Graciela Christianson    MR#: [id number]   : 1995   Date of Visit: Aug 10, 2018    REASON FOR VISIT: Preoperative evaluation for possible weight loss surgery    Dear Jorge Luis Tinsley,    I had the pleasure of seeing your patient, Graciela Christianson, in my preoperative bariatric clinic.    As you know, she is morbidly obese and considering weight loss surgery to treat obesity in association with her medical conditions of obesity.  Her consult weight was 369.  She has lost 35 pounds since her consult weight. She has not met her required pre-surgery weight.    Most recent weights:  Wt Readings from Last 4 Encounters:   08/10/18 (!) 334 lb 9.6 oz   18 (!) 352 lb 1.6 oz   18 (!) 368 lb 6.4 oz   17 (!) 369 lb     NBS visit with me 17 and patient decided against surgery. Last visit with me 18 and she restarted the process.  She also started phentermine in  and added topiramate in July. She stopped taking topiramate due to nausea and diarrhea.  She ran out of phentermine 2 weeks ago.  She has lost 17 lbs. She would like to restart phentermine.    Please refer to initial consult note from date 17 for patient's weight history and co-morbidities.    ROS    Past Medical History:   Diagnosis Date     Bone and joint disord back, pelvis, leg complicat preg, childb, puerp     hip issues when was 10     Depressive disorder     after baby depression     Gallbladder problem 2016    gallbladder was removed       Past Surgical History:   Procedure Laterality Date     CHOLECYSTECTOMY         Current Outpatient Prescriptions   Medication     ibuprofen (ADVIL/MOTRIN) 200 MG tablet     Multiple Vitamin (MULTI-VITAMINS) TABS     phentermine (ADIPEX-P) 37.5 MG tablet     topiramate (TOPAMAX) 25 MG tablet     No current facility-administered medications for this visit.        Allergies   Allergen  "Reactions     Lactose Other (See Comments)     Epigastric pain  Other reaction(s): GI intolerance  Cerner list Epigastric pain  Other reaction(s): Other - Describe In Comment Field  Epigastric pain     No Clinical Screening - See Comments      Other reaction(s): Other (see comments)  Cerner list no reaction       PHYSICAL EXAMINATION:  /68 (BP Location: Left arm, Patient Position: Sitting, Cuff Size: Adult Large)  Pulse 74  Temp 97.5  F (36.4  C) (Oral)  Resp 18  Ht 5' 8\"  Wt (!) 334 lb 9.6 oz  SpO2 95%  BMI 50.88 kg/m2   Body mass index is 50.88 kg/(m^2).   NAD NCAT  Respiratory: breathing unlabored  Abdomen: obese, soft/nt/nd      I emphasized exercise and activity behavior along with appropriate food choice as the main foundation for weight loss with surgery providing surgical reinforcement of the appropriate behavior set.    PLAN:    Psych eval is next Tuesday  IVC filter removed end of August  Hematology clearance end of August  Sleep study in 2 week  Dietitian visit next Tuesday (3/3)  Hoping to have surgery in Sept.  Call Dustin to schedule tentative surgery date. [phone]  See Dr Hopper for bariatric surgeon visit after psych eval complete    Review of general surgery weight loss process    1. Complete preoperative requirements, including weight loss.  Final weight check to confirm MANDATORY weight loss requirement must be documented on a clinic scale.    2. Discuss prior authorization with .    3. History and physical evaluation by PCP of PAC clinic within 30 days of surgery date, preoperative class, and weight check (weigh-in visit) to be scheduled by patient.  Pre-anesthesia clinic for risk evaluation to be scheduled by anesthesia clinic.    4. We cannot guarantee that patient will qualify for surgery unless all preoperative requirements are met, prior authorization from primary insurance company is granted, and insurance changes do not occur.    5. It is possible for " patients to regain all weight after weight loss surgery unless they follow guidelines prescribed by our bariatric center.    6. All patients with gastrointestinal complaints after weight loss surgery must have complaints conveyed to the bariatric team for appropriate treatment.    7. Vitamin deficiencies may develop post-bariatric surgery and annual laboratory testing should be performed.    8. Persistent nausea/vomiting after bariatric surgery entails risk of thiamine deficiency and should be treated early.  Vitamin B12 deficiency may develop, especially after gastric bypass surgery and must be recognized.        If you have any questions about our plans please don't hesitate to contact me.    Sincerely,    Orquidea Hyman PA-C    I spent a total of 15 minutes face to face with Graciela Christianson during today's office visit.  Over 50% of this time was spent counseling the patient and/or coordinating care.

## 2018-08-10 NOTE — NURSING NOTE
"No chief complaint on file.      Vitals:    08/10/18 0751   BP: 138/68   BP Location: Left arm   Patient Position: Sitting   Cuff Size: Adult Large   Pulse: 74   Resp: 18   Temp: 97.5  F (36.4  C)   TempSrc: Oral   SpO2: 95%   Weight: (!) 334 lb 9.6 oz   Height: 5' 8\"       Body mass index is 50.88 kg/(m^2).      HOANG Mathur, EMT                      "

## 2018-08-10 NOTE — PATIENT INSTRUCTIONS
Psych eval is next Tuesday  IVC filter removed end of August  Hematology clearance end of August  Sleep study in 2 week  Dietitian visit next Tuesday (3/3)  Hoping to have surgery in Sept.  Call Dustin to schedule tentative surgery date. [phone]  See Dr Hopper for bariatric surgeon visit after psych eval complete

## 2018-08-14 ENCOUNTER — OFFICE VISIT (OUTPATIENT)
Dept: NEUROPSYCHOLOGY | Facility: CLINIC | Age: 23
End: 2018-08-14
Payer: COMMERCIAL

## 2018-08-14 ENCOUNTER — ALLIED HEALTH/NURSE VISIT (OUTPATIENT)
Dept: SURGERY | Facility: CLINIC | Age: 23
End: 2018-08-14
Payer: COMMERCIAL

## 2018-08-14 DIAGNOSIS — E66.01 MORBID OBESITY (H): ICD-10-CM

## 2018-08-14 DIAGNOSIS — F54 PSYCHOLOGICAL FACTORS AFFECTING MEDICAL CONDITION: Primary | ICD-10-CM

## 2018-08-14 NOTE — PATIENT INSTRUCTIONS
GOALS:  Relating To Eating:  -Eat slowly (20-30 minutes per meal), chewing foods well (25 chews per bite/applesauce consistency).  -Focus on lean protein (skinless turkey/chicken breast, eggs, plain Greek yogurt, pork chop without rim of fat, etc) and non-starchy vegetables (green beans, celery, cucumber, carrots, broccoli, cauliflower, salad)/whole fruit at each meal.    Relating to beverages:  -Reduce caffeine/carbonation/calorie containing beverages.  -Separate fluids from meals by 30 minutes before, during, and after eating.    Relating to dietary supplements:  -Continue taking multivitamin containing iron daily.    Relating to activity:  -Increase activity level.  Exercise 2-3 days/week for 20 minutes.    Relating to cravings:  -Practice alternatives to emotion eating (stay busy with kids and go to bed early). Try not to skip breakfast.   -Rid your environment of baked goods and other sweet triggers.    - Try to limit weight check to 1x/day or less.     Post-op Goals:   1. Follow the bariatric post-op diet advancement schedule:  - Bariatric clear liquid diet through post-op day 1 (while in the hospital).  - Bariatric low-fat full liquid diet on post-op days 2 through 13 (2 weeks).  2. Sip on 48-64 oz (or greater) fluids daily, recording intake to help stay on-track.  - Drink at least 2 oz of fluid every 30 min.

## 2018-08-14 NOTE — MR AVS SNAPSHOT
MRN:[id number]                      After Visit Summary   8/14/2018    Graciela Christianson    MRN: [id number]           Visit Information        Provider Department      8/14/2018 9:30 AM Ade Hernandez RD M [facility] Surgical Weight Management        Your next 10 appointments already scheduled     Aug 14, 2018  1:00 PM CDT   (Arrive by 12:45 PM)   PSYCH EVALUATION with Sandra Lopez, PhD [facility] Neuro[facility]ology ([facility] [place])    [address] [address] [phone]            Aug 17, 2018  1:00 PM CDT   [facility] with Hardy Lara MD   [facility] ([place] [facility])    [address]  [facility] [address] [phone]           You will receive a sleep questionnaire packet in the mail from your clinic prior to the scheduled visit with your sleep specialist. If you do not receive that packet within 10 days of the scheduled appointment time please call the clinic directly.  At your consultation visit, the provider will review your current symptoms to determine the most appropriate treatment and determine if a sleep test is appropriate. If it is the right choice for you, it will be determined if you can have a home sleep test or an in lab test. Prior to any of this happening, your insurance will have to be prior authorized if applicable.  ***If you have a sleep machine currently, please bring it with you to your appointment***              Care Instructions      GOALS:  Relating To Eating:  -Eat slowly (20-30 minutes per meal), chewing foods well (25 chews per bite/applesauce consistency).  -Focus on lean protein (skinless turkey/chicken breast, eggs, plain Greek yogurt, pork chop without rim of fat, etc) and non-starchy vegetables (green beans, celery, cucumber, carrots, broccoli, cauliflower, salad)/whole fruit at each meal.    Relating to  beverages:  -Reduce caffeine/carbonation/calorie containing beverages.  -Separate fluids from meals by 30 minutes before, during, and after eating.    Relating to dietary supplements:  -Continue taking multivitamin containing iron daily.    Relating to activity:  -Increase activity level.  Exercise 2-3 days/week for 20 minutes.    Relating to cravings:  -Practice alternatives to emotion eating (stay busy with kids and go to bed early). Try not to skip breakfast.   -Rid your environment of baked goods and other sweet triggers.    - Try to limit weight check to 1x/day or less.     Post-op Goals:   1. Follow the bariatric post-op diet advancement schedule:  - Bariatric clear liquid diet through post-op day 1 (while in the hospital).  - Bariatric low-fat full liquid diet on post-op days 2 through 13 (2 weeks).  2. Sip on 48-64 oz (or greater) fluids daily, recording intake to help stay on-track.  - Drink at least 2 oz of fluid every 30 min.         [facility] Information     [facility] gives you secure access to your electronic health record. If you see a primary care provider, you can also send messages to your care team and make appointments. If you have questions, please call your primary care clinic.  If you do not have a primary care provider, please call [phone] and they will assist you.      [facility] is an electronic gateway that provides easy, online access to your medical records. With [facility], you can request a clinic appointment, read your test results, renew a prescription or communicate with your care team.     To access your existing account, please contact your [facility] or call [phone] for assistance.        Care EveryWhere ID     This is your Care EveryWhere ID. This could be used by other organizations to access your [place] medical records  [id number]        Equal Access to Services     BRYN GAMBINO AH: maryann Betancur qaybta kaalmada  ericka gillespie ah. So [facility] [phone].    ATENCIÓN: Si habla español, tiene a veronica disposición servicios gratuitos de asistencia lingüística. Llame al [phone].    We comply with applicable federal civil rights laws and [place] laws. We do not discriminate on the basis of race, color, national origin, age, disability, sex, sexual orientation, or gender identity.

## 2018-08-14 NOTE — MR AVS SNAPSHOT
After Visit Summary   8/14/2018    Graciela Christianson    MRN: [id number]           Patient Information     Date Of Birth          1995        Visit Information        Provider Department      8/14/2018 1:00 PM Sandra Lopez, PhD [facility] Neuropsychology        Today's Diagnoses     Psychological factors affecting medical condition    -  1    Morbid obesity (H)           Follow-ups after your visit        Who to contact     Please call your clinic at [phone] to:    Ask questions about your health    Make or cancel appointments    Discuss your medicines    Learn about your test results    Speak to your doctor            Additional Information About Your Visit        MyChart Information     [employer] [facility] gives you secure access to your electronic health record. If you see a primary care provider, you can also send messages to your care team and make appointments. If you have questions, please call your primary care clinic.  If you do not have a primary care provider, please call [phone] and they will assist you.      [employer] is an electronic gateway that provides easy, online access to your medical records. With [employer] [facility], you can request a clinic appointment, read your test results, renew a prescription or communicate with your care team.     To access your existing account, please contact your [facility] or call [phone] for assistance.        Care EveryWhere ID     This is your Care EveryWhere ID. This could be used by other organizations to access your [place] medical records  [id number]         Blood Pressure from Last 3 Encounters:   08/10/18 138/68   06/11/18 132/74   08/02/17 135/83    Weight from Last 3 Encounters:   08/10/18 (!) 151.8 kg (334 lb 9.6 oz)   07/09/18 (!) 159.7 kg (352 lb 1.6 oz)   06/11/18 (!) 167.1 kg (368 lb 6.4 oz)              We Performed the Following     69292-[id number] TEST BY PSYCHOLOGIST/MD, PER HR     C  PSYCHIATRIC DIAGNOSTIC EVALUATION        Primary Care Provider Office Phone # Fax #    Jorge Luis Kirkland [phone]       [place] [id number]        Equal Access to Services     BRYN GAMBINO : Leslie thompson pastorao Sokileyali, waaxda luqadaha, qaybta kaalmada adeegyada, ericka wesleyn joshpatricio zhu neville aldana. So [facility] [phone].    ATENCIÓN: Si habla español, tiene a veronica disposición servicios gratuitos de asistencia lingüística. Llame al [phone].    We comply with applicable federal civil rights laws and [place] laws. We do not discriminate on the basis of race, color, national origin, age, disability, sex, sexual orientation, or gender identity.            Thank you!     Thank you for choosing [facility] NEUROPSYCHOLOGY  for your care. Our goal is always to provide you with excellent care. Hearing back from our patients is one way we can continue to improve our services. Please take a few minutes to complete the written survey that you may receive in the mail after your visit with us. Thank you!             Your Updated Medication List - Protect others around you: Learn how to safely use, store and throw away your medicines at www.disposemymeds.org.          This list is accurate as of 8/14/18 11:59 PM.  Always use your most recent med list.                   Brand Name Dispense Instructions for use Diagnosis    ibuprofen 200 MG tablet    ADVIL/MOTRIN     Take 2 tablets by mouth        MULTI-VITAMINS Tabs      Take 1 tablet by mouth        phentermine 37.5 MG tablet    ADIPEX-P    15 tablet    Take 0.5 tablets (18.75 mg) by mouth every morning    Morbid obesity (H)

## 2018-08-14 NOTE — PROGRESS NOTES
"Bariatric Nutrition Consultation Note    Reason For Visit: Nutrition Reassessment    Graciela Christianson is a 23 year-old presenting today for a follow-up bariatric nutrition consult.  Pt is interested in laparoscopic sleeve gastrectomy with Dr. Hopper in September 2018.  Patient is accompanied by friend.  This is pt's third of 3 required (pt is re-starting the process) nutrition visits prior to surgery. Pt referred by Orquidea Hyman PA-C (6/11/18).     Coordination Note:  Updated RD visits.  Pt plans to have psych eval today (8/14/18) and hematology appointment is set as well as sleep study on 8/17/18.   Pt plans to call Dustin Bennett and set up an appointment with Dr. Hopper after all tasks are done.    She is interested in having weight loss surgery for the following reasons:  To improve overall health and wellbeing (possible sleep issue).     ANTHROPOMETRICS:    Height as of an earlier encounter on 8/2/17: 1.727 m (5' 8\").    Weight as of an earlier encounter on 8/2/17: 167.4 kg (369 lb) with BMI of 56.11.  Current Weight: 332.2 lbs  (-19.9 lbs since last month; -36.8 lbs since initial weight)    Required weight loss goal pre-op: -36 lbs from initial consult weight (goal weight 333 lbs or less before surgery)    SUPPLEMENT INFORMATION:  Multivitamin, vitamin B12    NUTRITION HISTORY:    (7/9/18): Pt stated she is finding herself getting obsessed with the number on the scale and she is weighing herself 3x/day. She is really frustrated because her weight has plateaued over the last week.     Progress Towards Previous Goals:  Relating To Eating:  -Eat slowly (20-30 minutes per meal), chewing foods well (25 chews per bite/applesauce consistency). - Meeting.   -Focus on lean protein (skinless turkey/chicken breast, eggs, plain Greek yogurt, pork chop without rim of fat, etc) and non-starchy vegetables (green beans, celery, cucumber, carrots, broccoli, cauliflower, salad)/whole fruit at each meal. - Meeting most of the time. "     Relating to beverages:  -Reduce caffeine/carbonation/calorie containing beverages. - Meeting most of the time.   -Separate fluids from meals by 30 minutes before, during, and after eating.  - Improving, still working on mastering this one.     Relating to dietary supplements:  -Continue taking multivitamin containing iron daily.- Meeting.     Relating to activity:  -Increase activity level.  Exercise 2-3 days/week for 20 minutes. - Pt has increased her activity level (hiking and walking around lake every night; running as well; Recently, she has been walking/jogging a few hours total throughout the day.)    Relating to cravings:  -Practice alternatives to emotion eating (stay busy with kids and go to bed early). Try not to skip breakfast.  - Meeting.   -Rid your environment of baked goods and other sweet triggers.   - Meeting.   -Try to limit weight check to 1x/day or less.  - Meeting most of the time.     Diet Recall (8/14/18):  Breakfast: 4 days/week will eat breakfast; 2 eggs, 1/2 orange, 1/2 avocado  Lunch: Grilled Chicken with salad (handful shredded cheese, 1/4 cup sunflower seeds)  Dinner: Chicken, vegetables  Beverages: Water, seldom soda pop    *Pt is willing to abstain from alcohol after surgery.     Eating Habits 7/24/2017   Do you have any dietary restrictions? No Food Allergies Known  Mild Intolerance to Lactose    Do you currently binge eat (eat a large amount of food in a short time)? Yes   Are you an emotional eater? Yes   Do you get up to eat after falling asleep? No   What foods do you crave? Sugar beverages; pastries/donuts       NUTRITION DIAGNOSIS:  Obesity r/t long history of self-monitoring deficit and excessive energy intake aeb BMI >30. - Continues, improving.     INTERVENTION:  Intervention Provided/Education Provided:  Praised Pt on excellent progress with goals and weight lost through positive changes made.  Reviewed goals.  Gave encouragement and support.    Provided instruction on  bariatric clear and low-fat full liquid diets.  Provided the following handouts: Diet Guidelines for Bariatric Surgery, Sources of Protein, Keeping Track of Your Fluids, list of recommended vitamin/mineral supplementation after SG surgery and RD contact information.  Patient Understanding: good  Expected Compliance: good    GOALS:  Relating To Eating:  -Eat slowly (20-30 minutes per meal), chewing foods well (25 chews per bite/applesauce consistency).  -Focus on lean protein (skinless turkey/chicken breast, eggs, plain Greek yogurt, pork chop without rim of fat, etc) and non-starchy vegetables (green beans, celery, cucumber, carrots, broccoli, cauliflower, salad)/whole fruit at each meal.    Relating to beverages:  -Reduce caffeine/carbonation/calorie containing beverages.  -Separate fluids from meals by 30 minutes before, during, and after eating.    Relating to dietary supplements:  -Continue taking multivitamin containing iron daily.    Relating to activity:  -Increase activity level.  Exercise 2-3 days/week for 20 minutes.    Relating to cravings:  -Practice alternatives to emotion eating (stay busy with kids and go to bed early). Try not to skip breakfast.   -Rid your environment of baked goods and other sweet triggers.    - Try to limit weight check to 1x/day or less.     Post-op Goals:   1. Follow the bariatric post-op diet advancement schedule:  - Bariatric clear liquid diet through post-op day 1 (while in the hospital).  - Bariatric low-fat full liquid diet on post-op days 2 through 13 (2 weeks).  2. Sip on 48-64 oz (or greater) fluids daily, recording intake to help stay on-track.  - Drink at least 2 oz of fluid every 30 min.    Follow-Up: 1 month    Time spent with patient: 30 minutes.  Ade Hernandez, MS, RDN, LDN, CLT  Pager: [phone]

## 2018-08-15 ENCOUNTER — TELEPHONE (OUTPATIENT)
Dept: SLEEP MEDICINE | Facility: CLINIC | Age: 23
End: 2018-08-15

## 2018-08-15 DIAGNOSIS — G47.33 OSA (OBSTRUCTIVE SLEEP APNEA): Primary | ICD-10-CM

## 2018-08-15 DIAGNOSIS — F51.04 PSYCHOPHYSIOLOGICAL INSOMNIA: ICD-10-CM

## 2018-08-15 RX ORDER — ZOLPIDEM TARTRATE 5 MG/1
TABLET ORAL
Qty: 1 TABLET | Refills: 0 | Status: SHIPPED | OUTPATIENT
Start: 2018-08-15 | End: 2018-08-17

## 2018-08-15 NOTE — TELEPHONE ENCOUNTER
Sleep Center   Telephone Sleep Medicine Consultation  August 15, 2018      Name: Graciela Christianson MRN# [id number]   Age: 23 year old YOB: 1995     Date of Consultation: August 15, 2018  Consultation is requested by: No referring provider defined for this encounter.  Primary care provider: Jorge Luis Kirkland           Reason for Sleep Consult:     Graciela Christianson is a 23 year old female who we are requested to see on behalf of of Dr. Nair and the bariatric team for evaluation of possible sleep apnea in the setting of morbid obesity.         Assessment and Plan:     Summary Sleep Diagnoses:      Morbid obesity-body mass index 52    History of deep venous thrombosis and pulmonary embolism 2016 status post filter placement    History of depression    Chronic hip pain    Possible hypoventilation or obstructive sleep apnea during sleep    Summary Recommendations:      Polysomnography with CO2 monitoring CPAP trial if significant-obstructive sleep apnea and bilevel trial if CO2 greater than 60    No orders of the defined types were placed in this encounter.        Summary Counseling:  See instructions    Counseling included a comprehensive review of diagnostic and therapeutic strategies as well as risks of inadequate therapy.  Educational materials provided in instructions.             History of Present Illness:     Graciela Christianson is a 23 year old female who is being evaluated for surgical management of obesity       SLEEP-WAKE SCHEDULE:   Patient denies difficulty initiating or maintaining sleep and has no symptoms of parasomnias or daytime sleepiness.  She occasionally has hip pain which delay sleep onset but no restless leg syndrome or rumination.  Her  denies regular snoring or observed apneas.    Although the risk of hypoventilation is markedly increased in this range of obesity, patient's previous awake oxygen saturation of 95% and normal bicarbonate levels makes this less  likely.                 Medications:     Current Outpatient Prescriptions   Medication Sig     ibuprofen (ADVIL/MOTRIN) 200 MG tablet Take 2 tablets by mouth     Multiple Vitamin (MULTI-VITAMINS) TABS Take 1 tablet by mouth     phentermine (ADIPEX-P) 37.5 MG tablet Take 0.5 tablets (18.75 mg) by mouth every morning     No current facility-administered medications for this visit.         Allergies   Allergen Reactions     Lactose Other (See Comments)     Epigastric pain  Other reaction(s): GI intolerance  Cerner list Epigastric pain  Other reaction(s): Other - Describe In Comment Field  Epigastric pain     No Clinical Screening - See Comments      Other reaction(s): Other (see comments)  Cerner list no reaction            Past Medical History:     Does not need 02 supplement at night   Past Medical History:   Diagnosis Date     Bone and joint disord back, pelvis, leg complicat preg, childb, puerp     hip issues when was 10     Depressive disorder     after baby depression     Gallbladder problem 2016    gallbladder was removed             Past Surgical History:    Previous upper airway surgery is a remote history of deep venous thrombosis and pulmonary embolism complicating  section 2 years ago and had filter placement.  Past Surgical History:   Procedure Laterality Date     CHOLECYSTECTOMY              Social History:     Social History   Substance Use Topics     Smoking status: Never Smoker     Smokeless tobacco: Never Used     Alcohol use Yes      Comment: rarely                Family History:     Family History   Problem Relation Age of Onset     Diabetes Paternal Grandfather      Hypertension Paternal Grandfather      Hyperlipidemia Father      Obesity Father      Colon Cancer Maternal Grandmother      Depression Paternal Grandmother      Anxiety Disorder Paternal Grandmother      Mental Illness Paternal Grandmother      Obesity Mother      Obesity Sister      Obesity Cousin      Ovarian Cancer  Other                     Data: All pertinent previous laboratory data reviewed     No results found for: PH, PHARTERIAL, PO2, [id number], SAT, PCO2, HCO3, BASEEXCESS, TANA, BEB  No results found for: TSH  Lab Results   Component Value Date    GLC 91 08/02/2017     Lab Results   Component Value Date    HGB 12.4 08/02/2017     Lab Results   Component Value Date    BUN 12 08/02/2017    CR 0.75 08/02/2017     Lab Results   Component Value Date    AST 14 08/02/2017    ALT 23 08/02/2017    ALKPHOS 81 08/02/2017    BILITOTAL 0.7 08/02/2017           Copy to: Jorge Luis Kirkland MD 8/15/2018

## 2018-08-16 ENCOUNTER — TELEPHONE (OUTPATIENT)
Dept: SLEEP MEDICINE | Facility: CLINIC | Age: 23
End: 2018-08-16

## 2018-08-17 ENCOUNTER — APPOINTMENT (OUTPATIENT)
Dept: SLEEP MEDICINE | Facility: CLINIC | Age: 23
End: 2018-08-17
Payer: COMMERCIAL

## 2018-08-17 DIAGNOSIS — F51.04 PSYCHOPHYSIOLOGICAL INSOMNIA: ICD-10-CM

## 2018-08-17 RX ORDER — ZOLPIDEM TARTRATE 5 MG/1
TABLET ORAL
Qty: 1 TABLET | Refills: 0 | Status: SHIPPED | OUTPATIENT
Start: 2018-08-17 | End: 2018-11-27

## 2018-08-20 RX ORDER — ZOLPIDEM TARTRATE 5 MG/1
TABLET ORAL
Qty: 1 TABLET | Refills: 0 | Status: SHIPPED | OUTPATIENT
Start: 2018-08-20 | End: 2018-11-27
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
## 2018-08-24 ENCOUNTER — TRANSFERRED RECORDS (OUTPATIENT)
Dept: HEALTH INFORMATION MANAGEMENT | Facility: CLINIC | Age: 23
End: 2018-08-24

## 2018-08-27 ENCOUNTER — TELEPHONE (OUTPATIENT)
Dept: SURGERY | Facility: CLINIC | Age: 23
End: 2018-08-27

## 2018-08-27 NOTE — TELEPHONE ENCOUNTER
----- Message from Dustin Bennett sent at 8/27/2018  2:46 PM CDT -----  Regarding: sleep  I spoke with Graciela today about the [facility] sleep appt on 8/17. She said something had come up for Dr. Lara and the sleep center was to call her back to get rescheduled for the sleep study. She has not heard from them. I called [facility] in [facility] to ask them to contact her as soon as possible to get this rescheduled so she could hopefully keep her tentative 9/25 OR date. They said they would call her.

## 2018-09-12 ENCOUNTER — CARE COORDINATION (OUTPATIENT)
Dept: SURGERY | Facility: CLINIC | Age: 23
End: 2018-09-12

## 2018-09-12 NOTE — PROGRESS NOTES
Tasklist updated.    Bariatric Task List  Status:  Is patient a candidate for bariatric surgery?:    - Sept 2018   Cleared to schedule surgeon consult?:    -     Status:  surgery evaluation in process -     Surgeon: Dr Hopper -     Tentative surgery month/year: October or November 2018, pending completion of tasks. -        Insurance: Insurance:  [employer] -     Cigna: PCP Recommendation and Medical Clearance:    -     HP Referral:    -     Other:  Need 3 months of RD visits for [facility]. -        Patient Info: Initial Weight:  369 -     Date of Initial Weight/Height:  8/2/2017 -     Goal Weight (lbs):  333 -     Required Weight Loss:  36 -     Surgery Type:  sleeve gastrectomy -        Dietician Visits: Structured weight loss required by insurance?:  Yes -     Dietician Visit 1:  Completed - 6/11/18   Dietician Visit 2:  Completed - 7/9/18: Completed - ND   Dietician Visit 3:  Completed - 8/14/18 appt (asim)   Dietician Visit additional:    -        Psychological Evaluation: Psych eval:  Completed - 8/14/18 Dr Lopez -cleared      Lab Work: Complete Blood Count:  Completed - 8/2/17   Comprehensive Metabolic Panel:  Completed - 8/2/17   Vitamin D:  Needed - 8/2/17 please recheck when PTH level is rechecked.   Hgb A1c:  Completed - 8/2/17   PTH:  Needed - 8/2/17= 123, please recheck.      Consults/ Clearance: Sleep Medicine:  Needed - Call [phone] to set up. 9/18/18 Sleep Study; 9/21/18 Follow-up appt.     Hematology:  Completed - History of PE and IVC filter. 8/24/18 Evgeny Rabago MD - scanned in chart, has recommendations.. To remove filter after WLS. To have VTE prophylaxis in hospital and 2-4 wks after discharge.   Other  Needed - Data deleted         PCP: Establish care with PCP:  Completed -     Follow up with PCP:    -     PCP letter of support:  Completed - 8/29/17 Jorge Luis Kirkland. bks      Smoking: Quit tobacco use (3 months smoke free)?:    -     Quit date:    -        Patient  "Education:  Information Session:  Completed -     Given \"Making your decision\" handout?:  Yes -     Given support group information?:  Yes -     Attended support group?:    -     Support plan in place?:  Completed -     Research consents signed?:  Yes -        Final Tasks:  Before surgery online class:  Needed -     Before surgery online class website link:  [email or web address]   After surgery online class:  Needed -     After surgery online class website link:  [email or web address]   Nurse visit for weigh-in and information:  Needed -     Pre-assessment clinic visit with anesthesia team for H&P:  Needed -     Final labs (Hgb, plt, T&S, UA):  Needed -        Notes: 7/27/18 Informed of MRI (Cognitive) Study with Dr Rajendra Jimenez to call and provide further information. Email sent to Tony. -           "

## 2018-09-14 ENCOUNTER — TELEPHONE (OUTPATIENT)
Dept: SLEEP MEDICINE | Facility: CLINIC | Age: 23
End: 2018-09-14

## 2018-09-14 NOTE — TELEPHONE ENCOUNTER
LVM to please call to schedule split /tcm with 60 min return visit Dr Lara. phone consult, Dr Lara ordered split/tcm no abg, return, noted on appointment edit note 8/16/18 appointment.

## 2018-09-14 NOTE — TELEPHONE ENCOUNTER
Spoke with Graciela about having sleep study this Saturday at [place] 9/15/18.  She declined as she had plans for the weekend.  She will go ahead and do the test at [facility] sleep lab. With follow up with Dr Bob .

## 2018-09-18 ENCOUNTER — THERAPY VISIT (OUTPATIENT)
Dept: SLEEP MEDICINE | Facility: CLINIC | Age: 23
End: 2018-09-18
Payer: COMMERCIAL

## 2018-09-18 DIAGNOSIS — G47.33 OSA (OBSTRUCTIVE SLEEP APNEA): ICD-10-CM

## 2018-09-18 PROCEDURE — 95810 POLYSOM 6/> YRS 4/> PARAM: CPT | Performed by: INTERNAL MEDICINE

## 2018-09-18 NOTE — MR AVS SNAPSHOT
After Visit Summary   9/18/2018    Graciela Christianson    MRN: [id number]           Patient Information     Date Of Birth          1995        Visit Information        Provider Department      9/18/2018 8:00 PM SLEEP LAB, BED ONE [facility]        Today's Diagnoses     TOMASA (obstructive sleep apnea)           Follow-ups after your visit        Your next 10 appointments already scheduled     Sep 21, 2018 11:30 AM CDT   Return Sleep Patient with George Bob MD   [facility] ([facility])    [address]  [place] [id number] [phone]            Sep 27, 2018 10:20 AM CDT   (Arrive by 10:05 AM)   Bariatric Surgeon Visit with Yuri Hopper MD   [facility] ([facility])    [address]  [address] [place] [address] [phone]              Who to contact     If you have questions or need follow up information about today's clinic visit or your schedule please contact [facility] directly at [phone].  Normal or non-critical lab and imaging results will be communicated to you by MyChart, letter or phone within 4 business days after the clinic has received the results. If you do not hear from us within 7 days, please contact the clinic through [employer] or phone. If you have a critical or abnormal lab result, we will notify you by phone as soon as possible.  Submit refill requests through [employer] or call your pharmacy and they will forward the refill request to us. Please allow 3 business days for your refill to be completed.          Additional Information About Your Visit        MyChart Information     [employer] gives you secure access to your electronic health record. If you see a primary care provider, you can also send messages to your care team and make appointments. If you have questions, please call your primary  [facility] clinic.  If you do not have a primary care provider, please call [phone] and they will assist you.        Care EveryWhere ID     This is your Care EveryWhere ID. This could be used by other organizations to access your [place] medical records  [id number]         Blood Pressure from Last 3 Encounters:   08/10/18 138/68   06/11/18 132/74   08/02/17 135/83    Weight from Last 3 Encounters:   08/10/18 (!) 151.8 kg (334 lb 9.6 oz)   07/09/18 (!) 159.7 kg (352 lb 1.6 oz)   06/11/18 (!) 167.1 kg (368 lb 6.4 oz)              We Performed the Following     Comprehensive Sleep Study        Primary Care Provider Office Phone # Fax #    Jorge Luis SIN Stephaniedmitry [phone]       [address] [address]        Equal Access to Services     BRYN GAMBINO : Hadii aad ku hadasho Soomaali, waaxda luqadaha, qaybta kaalmada adeegyada, ericka csottin octavio lozada . So [facility] [phone].    ATENCIÓN: Si habla español, tiene a veronica disposición servicios gratuitos de asistencia lingüística. Llame al [phone].    We comply with applicable federal civil rights laws and [place] laws. We do not discriminate on the basis of race, color, national origin, age, disability, sex, sexual orientation, or gender identity.            Thank you!     Thank you for choosing [facility]  for your care. Our goal is always to provide you with excellent care. Hearing back from our patients is one way we can continue to improve our services. Please take a few minutes to complete the written survey that you may receive in the mail after your visit with us. Thank you!             Your Updated Medication List - Protect others around you: Learn how to safely use, store and throw away your medicines at www.disposemymeds.org.          This list is accurate as of 9/18/18 11:59 PM.  Always use your most recent med list.                   Brand Name Dispense Instructions for use  Diagnosis    ibuprofen 200 MG tablet    ADVIL/MOTRIN     Take 2 tablets by mouth        MULTI-VITAMINS Tabs      Take 1 tablet by mouth        phentermine 37.5 MG tablet    ADIPEX-P    15 tablet    Take 0.5 tablets (18.75 mg) by mouth every morning    Morbid obesity (H)       * zolpidem 5 MG tablet    AMBIEN    1 tablet    Take tablet by mouth 15 minutes prior to sleep, for Sleep Study    Psychophysiological insomnia       * zolpidem 5 MG tablet    AMBIEN    1 tablet    Take tablet by mouth 15 minutes prior to sleep, for Sleep Study    Psychophysiological insomnia       * Notice:  This list has 2 medication(s) that are the same as other medications prescribed for you. Read the directions carefully, and ask your doctor or other care provider to review them with you.

## 2018-09-19 LAB — SLPCOMP: NORMAL

## 2018-09-19 NOTE — PROCEDURES
" SLEEP STUDY INTERPRETATION  DIAGNOSTIC POLYSOMNOGRAPHY REPORT      Patient: TONJA HOFFMAN  YOB: 1995  Study Date: 9/18/2018  MRN: [id number]  Referring Provider: Jorge Luis Kirkland MD  Ordering Provider: Hardy Lara MD    Indications for Polysomnography: The patient is a 23 y old Female who is 5' 8\" and weighs 321.0 lbs. Her BMI is 49.2, [place] sleepiness scale 7 and neck circumference is 41 cm. Relevant medical history includes morbid obesity, deep vein thrombosis and pulmonary embolism, depression. A diagnostic polysomnogram was performed to evaluate for sleep apnea/ hypoventilation/hypoxemia pre bariatric surgery.    Polysomnogram Data: A full night polysomnogram recorded the standard physiologic parameters including EEG, EOG, EMG, ECG, nasal and oral airflow. Respiratory parameters of chest and abdominal movements were recorded with respiratory inductance plethysmography. Oxygen saturation was recorded by pulse oximetry. Hypopnea scoring rule used: 1B 4%.    Sleep Architecture: Normal sleep efficiency with mildly increased arousal index.  The total recording time of the polysomnogram was 425.1 minutes. The total sleep time was 401.5 minutes. Sleep latency was normal at 7.5 minutes without the use of a sleep aid. REM latency was 73.0 minutes. Arousal index was mildly increased for age at 20.9 arousals per hour. Sleep efficiency was normal at 94.4%. Wake after sleep onset was 15.0 minutes. The patient spent 5.5% of total sleep time in Stage N1, 54.4% in Stage N2, 18.9% in Stage N3, and 21.2% in REM. Time in REM supine was 58.5 minutes.    Respiration: Light snoring without sleep apnea; no hypoventilation or  hypoxemia    Events ? The polysomnogram revealed a presence of 1 obstructive, 1 central, and 0 mixed apneas resulting in an apnea index of 0.3 events per hour. There were 15 obstructive hypopneas and 0 central hypopneas resulting in an obstructive hypopnea index of 2.2 and central " hypopnea index of 0 events per hour. The combined apnea/hypopnea index was 2.5 events per hour (central apnea/hypopnea index was 0.1 events per hour). The REM AHI was 9.9 events per hour. The supine AHI was 4.8 events per hour. The RERA index was 6.3 events per hour.  The RDI was 8.8 events per hour.    Snoring - was reported as light, intermittent.    Respiratory rate and pattern - was notable for normal respiratory rate and pattern.    Sustained Sleep Associated Hypoventilation - Transcutaneous carbon dioxide monitoring was used, however significant hypoventilation was not present with a maximum change from 48 to 53 mmHg and 0 minutes at or greater than 55 mmHg. (No baseline ABG available.)    Sleep Associated Hypoxemia - (Greater than 5 minutes O2 sat at or below 88%) was not present. Baseline oxygen saturation was 97.5%. Lowest oxygen saturation was 90.1%. Time spent less than or equal to 88% was 0 minutes. Time spent less than or equal to 89% was 0 minutes.      Movement Activity: No abnormal movement activity.    Periodic Limb Activity - There were 0 PLMs during the entire study.     REM EMG Activity - Excessive transient/sustained muscle activity was not present.    Nocturnal Behavior - Abnormal sleep related behaviors were not noted.    Bruxism - None apparent.    Cardiac Summary: Normal sinus rhythm and rates.  The average pulse rate was 75.0 bpm. The minimum pulse rate was 56.0 bpm while the maximum pulse rate was 108.9 bpm.  Arrhythmias were not noted.        Assessment:     Light snoring without sleep apnea; no hypoventilation or hypoxemia Overall AHI 2.5 events per hour.    Normal sleep efficiency with mildly increased arousal index.    No abnormal movement activity.    Normal sinus rhythm and rates.    Recommendations:    Proceed with bariatric surgery evaluation, no indication for PAP therapy.    Advice regarding the risks of drowsy driving.    Suggest optimizing sleep schedule and avoiding sleep  deprivation.    Weight management (BMI > 30).    Pharmacologic therapy should be used for management of restless legs syndrome only if present and clinically indicated and not based on the presence of periodic limb movements alone.    Diagnostic Codes:   Repetitive Intrusions Into Sleep F51.8        _____________________________________   Electronically Signed By: Gema Brady MD 9/19/2018           Range(%) Time in range (min)   0.0 - 89.0 -   0.0 - 88.0 -         Stage Min(mm Hg) Max(mm Hg)   Wake 37.6 52.9   NREM(1+2+3) 45.5 53.1   REM 48.6 52.8       Range(mmHg) Time in range (min)   55.0 - 100.0 -   Excluded data <20.0 & >90.0 0.2

## 2018-10-11 ENCOUNTER — OFFICE VISIT (OUTPATIENT)
Dept: SURGERY | Facility: CLINIC | Age: 23
End: 2018-10-11
Payer: COMMERCIAL

## 2018-10-11 VITALS
DIASTOLIC BLOOD PRESSURE: 75 MMHG | TEMPERATURE: 97.3 F | SYSTOLIC BLOOD PRESSURE: 136 MMHG | BODY MASS INDEX: 44.41 KG/M2 | HEART RATE: 89 BPM | WEIGHT: 293 LBS | HEIGHT: 68 IN | OXYGEN SATURATION: 98 %

## 2018-10-11 DIAGNOSIS — E66.01 MORBID OBESITY DUE TO EXCESS CALORIES (H): Primary | ICD-10-CM

## 2018-10-11 NOTE — NURSING NOTE
This patient is having sleeve by Dr. Hopper.    The following handouts were reviewed with the patient :  Before Your Surgery, Patient Checklist, Weight Loss Surgery Pre-operative Class, Preop Recommendations Quick Reference Guide, History and Physical, Medications to Avoid, Shower or Bathing Before Surgery, Bowel Preparation, Powerful Choices and [place] Advance Health Care Directive.  Questions were addressed and understanding of content was verbalized.  Contact information was provided.    Patient goal weight: 333  Weight today: 328    Advised to call diana for surgery/pac date

## 2018-10-11 NOTE — PATIENT INSTRUCTIONS
It was a pleasure meeting with you today.     Thank you for allowing us the privilege of caring for you. We hope we provided you with the excellent service you deserve.     Please let us know if there is anything else we can do for you so that we can be sure you are leaving completely satisfied with your care experience.      You saw Dr Hopper today.     Instructions per today's visit:     1) call diana for surgery date/time [phone]      To schedule appointments with our team, please call [phone] option #1    Please call during clinic hours Monday through Friday 8:00a - 4:00p if you have questions or you can contact us via [employer] at anytime.      Nurses: [phone]  Fax: [phone]  Surgery Scheduler: [phone]    Please call the hospital at [phone] to speak with our on call MDs if you have urgent needs after hours, during weekends, or holidays.    *For patients who are currently enrolled in our program and have not yet had weight loss surgery please note*:    You must be at your required goal weight at the time of your Anesthesia clinic visit as well as the day of surgery or your surgery will be canceled. You will not be able to obtain a new surgery date until you have met your goal weight.    Weight loss medications before and after surgery protocol:    Adipex (phentermine): Stop two days before surgery. Do not restart after surgery. May reconsider restarting as needed in the future.    Topimax (topiramate): Can take right up to surgery including morning of surgery and restart post op day #1.    Qsymia (phentermine/topiramate): Hold morning of surgery. Restart after surgery post op day #1    Contrave (bupropion/naltrxone): Fast taper before surgery. 1 tablet twice daily for 1 week and then discontinue 4 days before surgery.  Will reconsider restarting at postop appt once no longer taking narcotic pain meds.  Will slowly ramp up again.    Naltrexone: Stop 4 days before surgery.  Will  reconsider starting again at postop appts when no longer taking narcotic pain meds.    Belviq (locaserin): Stop 2 days before surgery and restart immediately after surgery    Victoza(liraglutide)/Saxenda(liraglutide 3mg)/Trulicity (dulaglutide) (Any GLP-1): Can take up to surgery date and restart immediately after surgery.    Main follow-up parameters for all bariatric patients     Patients who have undergone Bariatric surgery:    1. Follow-up interval during the first year: ~1 week, 1 month, 3 month, 6 month,12 months.  Every 6 months until 5 years; and then annually thereafter.  The goal of follow-up sessions is to ensure that food intake behavior (choice of food and eating speed) and exercise/activity level are set appropriately.    2. Yearly lab tests ordered by our clinic.      3. The bariatric team should be aware and potentially evaluate all adverse gastrointestinal symptoms (dysphagia, abdominal pain, nausea, vomiting, diarrhea, heartburn, reflux, etc), which can be a sign of complication.  The bariatric surgeons perform general surgery procedures in addition to bariatric surgery (laparoscopic cholecystectomy, etc.)    4. Inability to tolerate textured food (chicken, steak, fish, eggs, beans) is NOT normal and may need to be evaluated by endoscopy performed by the bariatric surgeon.

## 2018-10-11 NOTE — NURSING NOTE
"(   Chief Complaint   Patient presents with     RECHECK     PLS do preop teaching if close to or at goal     )    ( Weight: 328 lb 12.8 oz )  ( Height: 5' 8\" )  ( BMI (Calculated): 50.1 )  ( Initial Weight: 369 lb )  ( Cumulative weight loss (lbs): 40.2 )  ( Last Visits Weight: 368 lb 6.4 oz )  ( Wt change since last visit (lbs): -39.6 )  (   )  (   )    ( BP: 136/75 )  (   )  ( Temp: 97.3  F (36.3  C) )  ( Temp src: Oral )  ( Pulse: 89 )  (   )  ( SpO2: 98 % )    (   Patient Active Problem List   Diagnosis     Morbid obesity (H)    )  (   Current Outpatient Prescriptions   Medication Sig Dispense Refill     ibuprofen (ADVIL/MOTRIN) 200 MG tablet Take 2 tablets by mouth       Multiple Vitamin (MULTI-VITAMINS) TABS Take 1 tablet by mouth       phentermine (ADIPEX-P) 37.5 MG tablet Take 0.5 tablets (18.75 mg) by mouth every morning 15 tablet 3     zolpidem (AMBIEN) 5 MG tablet Take tablet by mouth 15 minutes prior to sleep, for Sleep Study (Patient not taking: Reported on 10/11/2018) 1 tablet 0     zolpidem (AMBIEN) 5 MG tablet Take tablet by mouth 15 minutes prior to sleep, for Sleep Study (Patient not taking: Reported on 10/11/2018) 1 tablet 0    )  ( Diabetes Eval:    )    ( Pain Eval:  Data Unavailable )    ( Wound Eval:       )    (   History   Smoking Status     Never Smoker   Smokeless Tobacco     Never Used    )    ( Signed By:  Digna Valdes; October 11, 2018; 11:39 AM )    "

## 2018-10-11 NOTE — LETTER
"10/11/2018       RE: Graciela Christianson  [address], [place] [address]     Dear Colleague,    Thank you for referring your patient, Graciela Christianson, to the [facility]. Please see a copy of my visit note below.    Dear Jorge Luis Tinsley,      Referring provider: No flowsheet data found.  I was asked to see the patient regarding obesity by the referring provider above.    I had the pleasure of meeting with your patient Graciela Christianson in our weight loss surgery office.  This patient is a 23 year old female who has been undergoing our thorough preoperative screening process in anticipation of potential bariatric surgery.    At initial evaluation we recorded Graciela Christianson's height of 5' 8\", a weight of 369 lbs 0 oz, and calculated body mass index of 56.11 kg/(m^2). The patient has been unsuccessful with other methods of permanent weight loss and suffers from multiple weight related medical conditions.  Due to lack of success in achieving weight loss through other methods, she is interested in undergoing bariatric surgery.    PREVIOUS WEIGHT LOSS ATTEMPTS:     7/24/2017   I have tried the following methods to lose weight Watching portions or calories, Weight Watchers, Slimfast       CO-MORBIDITIES OF OBESITY INCLUDE:     7/24/2017   I have the following co-morbidities associated with obesity: None of the above       VITALS:  /75  Pulse 89  Temp 97.3  F (36.3  C) (Oral)  Ht 5' 8\"  Wt 328 lb 12.8 oz  SpO2 98%  BMI 49.99 kg/m2    PE:  GENERAL: Alert and oriented x3. NAD  HEENT exam: Sclerae not icteric. Hearing good. Head normocephalic and atraumatic.   CARDIOVASCULAR: No JVD  RESPIRATORY: Breathing unlabored  GASTROINTESTINAL: Obese, inc from prior porsha.  LOWER EXTREMITIES: no deformities  MUSCULOSKELETAL: Normal gait, Moves all 4 extremities equal and strong  NEUROLOGIC: no gross defect  SKIN: warm and dry to touch "     In summary, she has undergone an appropriate medical evaluation, dietitian evaluation, as well as psychologic screening. The patient appears to be an appropriate candidate for bariatric surgery.    In the office today, I discussed the laparoscopic gastric sleeve surgery.  Risks, benefits and anticipated outcomes were outlined including the risk of death, staple line leak (1-2%), PE, DVT, ulcer, worsening GERD, N/V, stricture, hernia, wound infection, weight regain, and vitamin deficiencies. This patient has a good chance of sustaining permanent weight loss due to this procedure.  This should also allow improvement if not resolution of his/her weight related medical conditions.    At present we are going to present your patient's file for prior authorization to insurance.  Pending prior authorization, I anticipate a surgical date in the near future.  We will keep you updated on any progress.  If you have questions regarding care please feel free to contact me.  Total time spent in the clinic was 15 minutes with greater than 50% in face-to-face consultation.      Again, thank you for allowing me to participate in the care of your patient.      Sincerely,    Yuri Hopper MD

## 2018-10-11 NOTE — MR AVS SNAPSHOT
After Visit Summary   10/11/2018    Graciela Christianson    MRN: [id number]           Patient Information     Date Of Birth          1995        Visit Information        Provider Department      10/11/2018 11:40 AM Yuri Hopper MD [facility] Surgical Weight Management        Today's Diagnoses     Obesity due to excess calories    -  1      Care Instructions    It was a pleasure meeting with you today.     Thank you for allowing us the privilege of caring for you. We hope we provided you with the excellent service you deserve.     Please let us know if there is anything else we can do for you so that we can be sure you are leaving completely satisfied with your care experience.      You saw Dr Hopper today.     Instructions per today's visit:     1) call [facility] for surgery date/time [phone]      To schedule appointments with our team, please call [phone] option #1    Please call during clinic hours Monday through Friday 8:00a - 4:00p if you have questions or you can contact us via [employer] at anytime.      Nurses: [phone]  Fax: [phone]  Surgery Scheduler: [phone]    Please call the hospital at [phone] to speak with our on call MDs if you have urgent needs after hours, during weekends, or holidays.    *For patients who are currently enrolled in our program and have not yet had weight loss surgery please note*:    You must be at your required goal weight at the time of your Anesthesia clinic visit as well as the day of surgery or your surgery will be canceled. You will not be able to obtain a new surgery date until you have met your goal weight.    Weight loss medications before and after surgery protocol:    Adipex (phentermine): Stop two days before surgery. Do not restart after surgery. May reconsider restarting as needed in the future.    Topimax (topiramate): Can take right up to surgery including morning of surgery and restart post op day #1.    Qsymia  (phentermine/topiramate): Hold morning of surgery. Restart after surgery post op day #1    Contrave (bupropion/naltrxone): Fast taper before surgery. 1 tablet twice daily for 1 week and then discontinue 4 days before surgery.  Will reconsider restarting at postop appt once no longer taking narcotic pain meds.  Will slowly ramp up again.    Naltrexone: Stop 4 days before surgery.  Will reconsider starting again at postop appts when no longer taking narcotic pain meds.    Belviq (locaserin): Stop 2 days before surgery and restart immediately after surgery    Victoza(liraglutide)/Saxenda(liraglutide 3mg)/Trulicity (dulaglutide) (Any GLP-1): Can take up to surgery date and restart immediately after surgery.    Main follow-up parameters for all bariatric patients     Patients who have undergone Bariatric surgery:    1. Follow-up interval during the first year: ~1 week, 1 month, 3 month, 6 month,12 months.  Every 6 months until 5 years; and then annually thereafter.  The goal of follow-up sessions is to ensure that food intake behavior (choice of food and eating speed) and exercise/activity level are set appropriately.    2. Yearly lab tests ordered by our clinic.      3. The bariatric team should be aware and potentially evaluate all adverse gastrointestinal symptoms (dysphagia, abdominal pain, nausea, vomiting, diarrhea, heartburn, reflux, etc), which can be a sign of complication.  The bariatric surgeons perform general surgery procedures in addition to bariatric surgery (laparoscopic cholecystectomy, etc.)    4. Inability to tolerate textured food (chicken, steak, fish, eggs, beans) is NOT normal and may need to be evaluated by endoscopy performed by the bariatric surgeon.                        Follow-ups after your visit        Additional Services     PAC Visit Referral (For [facility] Only)       Does this visit require an Anesthesia consult?  Yes - Evaluate for medical necessity related to one of the following  conditions:  Morbid Obesity    H&P done by:  pac      Please be aware that coverage of these services is subject to the terms and limitations of your health insurance plan.  Call member services at your health plan with any benefit or coverage questions.      Please bring the following to your appointment:  >>   Any x-rays, CTs or MRIs which have been performed.  Contact the facility where they were done to arrange for  prior to your scheduled appointment.  Any new CT, MRI or other procedures ordered by your specialist must be performed at a [place] facility or coordinated by your clinic's referral office.    >>   List of current medications  >>   This referral request   >>   Any documents/labs given to you for this referral                  Who to contact     Please call your clinic at [phone] to:    Ask questions about your health    Make or cancel appointments    Discuss your medicines    Learn about your test results    Speak to your doctor            Additional Information About Your Visit        [facility] Information     [employer] gives you secure access to your electronic health record. If you see a primary care provider, you can also send messages to your care team and make appointments. If you have questions, please call your primary care clinic.  If you do not have a primary care provider, please call [phone] and they will assist you.      Asp[employer] is an electronic gateway that provides easy, online access to your medical records. With [employer], you can request a clinic appointment, read your test results, renew a prescription or communicate with your care team.     To access your existing account, please contact your [facility] or call [phone] for assistance.        Care EveryWhere ID     This is your Care EveryWhere ID. This could be used by other organizations to access your [place] medical records  [id number]        Your Vitals Were     Pulse  "Temperature Height Pulse Oximetry BMI (Body Mass Index)       89 97.3  F (36.3  C) (Oral) 5' 8\" 98% 49.99 kg/m2        Blood Pressure from Last 3 Encounters:   10/11/18 136/75   08/10/18 138/68   06/11/18 132/74    Weight from Last 3 Encounters:   10/11/18 328 lb 12.8 oz   08/10/18 (!) 334 lb 9.6 oz   07/09/18 (!) 352 lb 1.6 oz              We Performed the Following     PAC Visit Referral (For [facility] Only)     Liz-Operative Worksheet - Laparoscopic Sleeve Gastrectomy        Primary Care Provider Office Phone # Fax #    [facility] [phone]       [place] [phone]        Equal Access to Services     BRYN GAMBINO : Hadii catalino guilloryo Sotonya, waaxda luqadaha, qaybta kaalmada adenegra, ericka aldana. So [facility] [phone].    ATENCIÓN: Si habla español, tiene a veronica disposición servicios gratuitos de asistencia lingüística. Jaleel al [phone].    We comply with applicable federal civil rights laws and [place] laws. We do not discriminate on the basis of race, color, national origin, age, disability, sex, sexual orientation, or gender identity.            Thank you!     Thank you for choosing [facility] SURGICAL WEIGHT MANAGEMENT  for your care. Our goal is always to provide you with excellent care. Hearing back from our patients is one way we can continue to improve our services. Please take a few minutes to complete the written survey that you may receive in the mail after your visit with us. Thank you!             Your Updated Medication List - Protect others around you: Learn how to safely use, store and throw away your medicines at www.disposemymeds.org.          This list is accurate as of 10/11/18 12:10 PM.  Always use your most recent med list.                   Brand Name Dispense Instructions for use Diagnosis    ibuprofen 200 MG tablet    ADVIL/MOTRIN     Take 2 tablets by mouth        MULTI-VITAMINS Tabs      " Take 1 tablet by mouth        phentermine 37.5 MG tablet    ADIPEX-P    15 tablet    Take 0.5 tablets (18.75 mg) by mouth every morning    Morbid obesity (H)       * zolpidem 5 MG tablet    AMBIEN    1 tablet    Take tablet by mouth 15 minutes prior to sleep, for Sleep Study    Psychophysiological insomnia       * zolpidem 5 MG tablet    AMBIEN    1 tablet    Take tablet by mouth 15 minutes prior to sleep, for Sleep Study    Psychophysiological insomnia       * Notice:  This list has 2 medication(s) that are the same as other medications prescribed for you. Read the directions carefully, and ask your doctor or other care provider to review them with you.

## 2018-10-20 NOTE — PROGRESS NOTES
"Dear Jorge Luis Tinsley,      Referring provider: No flowsheet data found.  I was asked to see the patient regarding obesity by the referring provider above.    I had the pleasure of meeting with your patient Graciela Christianson in our weight loss surgery office.  This patient is a 23 year old female who has been undergoing our thorough preoperative screening process in anticipation of potential bariatric surgery.    At initial evaluation we recorded Graciela Christianson's height of 5' 8\", a weight of 369 lbs 0 oz, and calculated body mass index of 56.11 kg/(m^2). The patient has been unsuccessful with other methods of permanent weight loss and suffers from multiple weight related medical conditions.  Due to lack of success in achieving weight loss through other methods, she is interested in undergoing bariatric surgery.    PREVIOUS WEIGHT LOSS ATTEMPTS:     7/24/2017   I have tried the following methods to lose weight Watching portions or calories, Weight Watchers, Slimfast       CO-MORBIDITIES OF OBESITY INCLUDE:     7/24/2017   I have the following co-morbidities associated with obesity: None of the above       VITALS:  /75  Pulse 89  Temp 97.3  F (36.3  C) (Oral)  Ht 5' 8\"  Wt 328 lb 12.8 oz  SpO2 98%  BMI 49.99 kg/m2    PE:  GENERAL: Alert and oriented x3. NAD  HEENT exam: Sclerae not icteric. Hearing good. Head normocephalic and atraumatic.   CARDIOVASCULAR: No JVD  RESPIRATORY: Breathing unlabored  GASTROINTESTINAL: Obese, inc from prior porsha.  LOWER EXTREMITIES: no deformities  MUSCULOSKELETAL: Normal gait, Moves all 4 extremities equal and strong  NEUROLOGIC: no gross defect  SKIN: warm and dry to touch     In summary, she has undergone an appropriate medical evaluation, dietitian evaluation, as well as psychologic screening. The patient appears to be an appropriate candidate for bariatric surgery.    In the office today, I discussed the laparoscopic gastric sleeve surgery.  Risks, benefits " and anticipated outcomes were outlined including the risk of death, staple line leak (1-2%), PE, DVT, ulcer, worsening GERD, N/V, stricture, hernia, wound infection, weight regain, and vitamin deficiencies. This patient has a good chance of sustaining permanent weight loss due to this procedure.  This should also allow improvement if not resolution of his/her weight related medical conditions.    At present we are going to present your patient's file for prior authorization to insurance.  Pending prior authorization, I anticipate a surgical date in the near future.  We will keep you updated on any progress.  If you have questions regarding care please feel free to contact me.  Total time spent in the clinic was 15 minutes with greater than 50% in face-to-face consultation.        Sincerely,    Yuri Hopper    Please route or send letter to:  Primary Care Provider (PCP) and Referring Provider

## 2018-11-07 ENCOUNTER — CARE COORDINATION (OUTPATIENT)
Dept: SURGERY | Facility: CLINIC | Age: 23
End: 2018-11-07

## 2018-11-07 NOTE — PROGRESS NOTES
Tasklist updated.    Bariatric Task List  Status:  Is patient a candidate for bariatric surgery?:    - Sept 2018   Cleared to schedule surgeon consult?:    -     Status:  surgery evaluation in process -     Surgeon: Dr Hopper -     Tentative surgery month/year: October or November 2018, pending completion of tasks. -        Insurance: Insurance:  BCBS Federal -     Cigna: PCP Recommendation and Medical Clearance:    -     HP Referral:    -     Other:  Need 3 months of RD visits for [facility]. -        Patient Info: Initial Weight:  369 -     Date of Initial Weight/Height:  8/2/2017 -     Goal Weight (lbs):  333 -     Required Weight Loss:  36 -     Surgery Type:  sleeve gastrectomy -     Multidisciplinary Meeting:    -        Dietician Visits: Structured weight loss required by insurance?:  Yes -     Dietician Visit 1:  Completed - 6/11/18   Dietician Visit 2:  Completed - 7/9/18: Completed - ND   Dietician Visit 3:  Completed - 8/14/18 appt (asim)   Dietician Visit 4:    -     Dietician Visit 5:    -     Dietician Visit 6:    -     Dietician Visit additional:    -     Clearance from dietician to see surgeon?:    -     Dietician Notes:    -        Psychological Evaluation: Psych eval:  Completed - 8/14/18 Dr Lopez -cleared   Therapist letter of support:    - Data deleted   Psychiatrist letter of support:    -     Establish care with therapist:    -     Complete eating disorder evaluation:    -     Letter of clearance from therapist/eating disorder program:    -     Other:    -        Lab Work: Complete Blood Count:  Completed - 8/2/17   Comprehensive Metabolic Panel:  Completed - 8/2/17   Vitamin D:  Needed - 8/2/17 please recheck when PTH level is rechecked.   Hgb A1c:  Completed - 8/2/17   PTH:  Needed - 8/2/17= 123, please recheck.   H. pylori:    -     TSH:    -     Nicotine Testing:    -     Other:    -        Consults/ Clearance: Sleep Medicine:  Completed -   Sleep, PSG 9/18- No TOMASA;   Cardiac:    -    "  Pain:   -     Dental:    -     Endocrine:    -     Gastroenterology:    -     Vascular Medicine:    -     Hematology:  Completed - History of PE and IVC filter. 8/24/18 Evgeny Rabago MD - scanned in chart, has recommendations.. To remove filter after WLS. To have VTE prophylaxis in hospital and 2-4 wks after discharge.   Medical Weight Management:   -     Physical Therapy/Exercise:    -     Nephrology:    -     Neurology:    -     Pulmonology:    -     Rheumatology:    -     Other    -     Other    -     Other    - Data deleted         Testing: UGI:    -     EGD:    -     Other:    -        PCP: Establish care with PCP:  Completed -     Follow up with PCP:    -     PCP letter of support:  Completed - 8/29/17 Jorge Luis Kirkland. bks      Smoking: Quit tobacco use (3 months smoke free)?:    -     Quit date:    -        Patient Education:  Information Session:  Completed -     Given \"Making your decision\" handout?:  Yes -     Given support group information?:  Yes -     Attended support group?:    -     Support plan in place?:  Completed -     Research consents signed?:  Yes -        Additional Surgery Requirements: Review Coag plan:    -     HgA1c <8:    -     Inpatient pain consult:    -     Final nicotine screen:    -     Dental work complete:    -     Birth control plan:    -     Other Requirements:    -     Other Requirements:    -        Final Tasks:  Before surgery online class:  Needed -     Before surgery online class website link:  [email or web address]   After surgery online class:  Needed -     After surgery online class website link:  [email or web address]   Nurse visit for weigh-in and information:  Needed -     Pre-assessment clinic visit with anesthesia team for H&P:  Needed -     Final labs (Hgb, plt, T&S, UA):  Needed -        Notes: 7/27/18 Informed of MRI (Cognitive) Study with Dr Rajendra Jimenez to call and provide further information. Email sent " to Tony. -

## 2018-11-14 ENCOUNTER — CARE COORDINATION (OUTPATIENT)
Dept: SURGERY | Facility: CLINIC | Age: 23
End: 2018-11-14

## 2018-11-23 ENCOUNTER — ANESTHESIA EVENT (OUTPATIENT)
Dept: SURGERY | Facility: CLINIC | Age: 23
End: 2018-11-23
Payer: COMMERCIAL

## 2018-11-23 ENCOUNTER — PRE VISIT (OUTPATIENT)
Dept: SURGERY | Facility: CLINIC | Age: 23
End: 2018-11-23

## 2018-11-27 ENCOUNTER — OFFICE VISIT (OUTPATIENT)
Dept: SURGERY | Facility: CLINIC | Age: 23
End: 2018-11-27
Payer: COMMERCIAL

## 2018-11-27 VITALS
DIASTOLIC BLOOD PRESSURE: 85 MMHG | HEART RATE: 95 BPM | RESPIRATION RATE: 18 BRPM | SYSTOLIC BLOOD PRESSURE: 124 MMHG | OXYGEN SATURATION: 99 % | WEIGHT: 293 LBS | TEMPERATURE: 97.4 F | HEIGHT: 68 IN | BODY MASS INDEX: 44.41 KG/M2

## 2018-11-27 DIAGNOSIS — Z01.818 PREOP EXAMINATION: ICD-10-CM

## 2018-11-27 DIAGNOSIS — E66.01 MORBID OBESITY (H): ICD-10-CM

## 2018-11-27 DIAGNOSIS — Z01.818 PREOP EXAMINATION: Primary | ICD-10-CM

## 2018-11-27 LAB
ALBUMIN UR-MCNC: NEGATIVE MG/DL
APPEARANCE UR: CLEAR
BILIRUB UR QL STRIP: NEGATIVE
COLOR UR AUTO: YELLOW
ERYTHROCYTE [DISTWIDTH] IN BLOOD BY AUTOMATED COUNT: 13 % (ref 10–15)
GLUCOSE UR STRIP-MCNC: NEGATIVE MG/DL
HBA1C MFR BLD: 5.3 % (ref 0–5.6)
HCT VFR BLD AUTO: 44.8 % (ref 35–47)
HGB BLD-MCNC: 14.7 G/DL (ref 11.7–15.7)
HGB UR QL STRIP: NEGATIVE
KETONES UR STRIP-MCNC: NEGATIVE MG/DL
LEUKOCYTE ESTERASE UR QL STRIP: NEGATIVE
MCH RBC QN AUTO: 27.9 PG (ref 26.5–33)
MCHC RBC AUTO-ENTMCNC: 32.8 G/DL (ref 31.5–36.5)
MCV RBC AUTO: 85 FL (ref 78–100)
NITRATE UR QL: NEGATIVE
PH UR STRIP: 7 PH (ref 5–7)
PLATELET # BLD AUTO: 348 10E9/L (ref 150–450)
PTH-INTACT SERPL-MCNC: 73 PG/ML (ref 18–80)
RBC # BLD AUTO: 5.27 10E12/L (ref 3.8–5.2)
SOURCE: NORMAL
SP GR UR STRIP: 1.01 (ref 1–1.03)
UROBILINOGEN UR STRIP-MCNC: 0 MG/DL (ref 0–2)
WBC # BLD AUTO: 8.4 10E9/L (ref 4–11)

## 2018-11-27 ASSESSMENT — ENCOUNTER SYMPTOMS: ORTHOPNEA: 0

## 2018-11-27 ASSESSMENT — LIFESTYLE VARIABLES: TOBACCO_USE: 0

## 2018-11-27 NOTE — ANESTHESIA PREPROCEDURE EVALUATION
Anesthesia Pre-Procedure Evaluation    Patient: Graciela Christianson   MRN:     [id number] Gender:   female   Age:    23 year old :      1995        Preoperative Diagnosis: Morbid Obesity    Procedure(s):  Laparoscopic Sleeve Gastrectomy     Past Medical History:   Diagnosis Date     Bone and joint disord back, pelvis, leg complicat preg, childb, puerp     hip issues when was 10     History of pulmonary embolism      Morbid obesity (H) 2017     Presence of IVC filter       Past Surgical History:   Procedure Laterality Date      SECTION       CHOLECYSTECTOMY       HIP SURGERY Bilateral     Slipped Capital Femoral Epiphysis      IR IVC FILTER PLACEMENT            Anesthesia Evaluation     . Pt has had prior anesthetic. Type: General and MAC    History of anesthetic complications   - motion sickness        ROS/MED HX    ENT/Pulmonary:  - neg pulmonary ROS    (-) tobacco use and recent URI   Neurologic: Comment: Last migraine was 1 week ago, treats with sleep.      (+)migraines,     Cardiovascular:  - neg cardiovascular ROS      (-) taking anticoagulants/antiplatelets, COLBY, orthopnea/PND, syncope and irregular heartbeat/palpitations   METS/Exercise Tolerance:  >4 METS   Hematologic: Comments: IVC filter is in place.      (+) History of blood clots pt is not anticoagulated, -      Musculoskeletal:  - neg musculoskeletal ROS       GI/Hepatic:  - neg GI/hepatic ROS       Renal/Genitourinary:  - ROS Renal section negative       Endo:     (+) Obesity, .      Psychiatric:     (+) psychiatric history anxiety and depression      Infectious Disease:  - neg infectious disease ROS       Malignancy:      - no malignancy   Other:    - neg other ROS                     PHYSICAL EXAM:   Mental Status/Neuro: A/A/O   Airway: Facies: Feasible  Mallampati: I  Mouth/Opening: Full  TM distance: > 6 cm  Neck ROM: Full   Respiratory: Auscultation: CTAB     Resp. Rate: Normal     Resp. Effort: Normal      CV: Rhythm:  "Regular  Rate: Age appropriate  Heart: Normal Sounds   Comments:      Dental: Normal                  Lab Results   Component Value Date    WBC 10.8 08/02/2017    HGB 12.4 08/02/2017    HCT 39.3 08/02/2017     08/02/2017     08/02/2017    POTASSIUM 3.8 08/02/2017    CHLORIDE 105 08/02/2017    CO2 20 08/02/2017    BUN 12 08/02/2017    CR 0.75 08/02/2017    GLC 91 08/02/2017    MATTHEW 8.8 08/02/2017    ALBUMIN 3.6 08/02/2017    PROTTOTAL 8.7 08/02/2017    ALT 23 08/02/2017    AST 14 08/02/2017    ALKPHOS 81 08/02/2017    BILITOTAL 0.7 08/02/2017       Preop Vitals  BP Readings from Last 3 Encounters:   10/11/18 136/75   08/10/18 138/68   06/11/18 132/74    Pulse Readings from Last 3 Encounters:   10/11/18 89   08/10/18 74   06/11/18 74      Resp Readings from Last 3 Encounters:   08/10/18 18    SpO2 Readings from Last 3 Encounters:   10/11/18 98%   08/10/18 95%   06/11/18 98%      Temp Readings from Last 1 Encounters:   10/11/18 97.3  F (36.3  C) (Oral)    Ht Readings from Last 1 Encounters:   10/11/18 1.727 m (5' 8\")      Wt Readings from Last 1 Encounters:   10/11/18 149.1 kg (328 lb 12.8 oz)    Estimated body mass index is 49.99 kg/(m^2) as calculated from the following:    Height as of 10/11/18: 1.727 m (5' 8\").    Weight as of 10/11/18: 149.1 kg (328 lb 12.8 oz).     LDA:            Assessment:   ASA SCORE: 2    NPO Status: > 6 hours since completed Solid Foods   Documentation: H&P complete; Preop Testing complete; Consents complete   Proceeding: Proceed without further delay  Tobacco Use:  Active user of Tobacco     Plan:   Anes. Type:  General   Pre-Induction: Midazolam IV   Induction:  IV (Standard)   Airway: Oral ETT   Access/Monitoring: PIV   Maintenance: Balanced   Emergence: Procedure Site   Logistics: Same Day Surgery     Postop Pain/Sedation Strategy:  Standard-Options: Opioids PRN     PONV Management:  Adult Risk Factors: Female, Postop Opioids  Prevention: Ondansetron; Dexamethasone "     CONSENT: Direct conversation   Plan and risks discussed with: Patient   Blood Products: Consented (ALL Blood Products)                  PAC Discussion and Assessment    ASA Classification: 2  Case is suitable for: [place]  Anesthetic techniques and relevant risks discussed: GA and GA with regional block for post-op pain control  Invasive monitoring and risk discussed:   Types:   Possibility and Risk of blood transfusion discussed:   NPO instructions given:   Additional anesthetic preparation and risks discussed:   Needs early admission to pre-op area:   Other:     PAC Resident/NP Anesthesia Assessment:  Graciela Christianson is a 23 year old female scheduled to undergo Laparoscopic Sleeve Gastrectomy on 12/11/18 with Dr. Yuri Hopper.    She has the following specific operative considerations:   1.  Increased risk of postoperative nausea/vomiting with motion sickness: Recommend use of antiemetic agents in the perioperative period.    2.  History of PE during complicated twin pregnancy with preeclampsia and acute cholecystitis, IVC filter placed after the patient developed a rectus sheath hematoma: These events occurred in June of 2016, and while the patient is no longer an anticoagulation, the IVC filter was not removed as planned.  The patient was evaluated by Hematology at [facility] on 8/24/18, who recommended that the IVC filter be left in place until she has recovered from the gastric bypass surgery, and that SCDs be in place intraoperatively.    3.  Obesity: Recommend careful positioning to prevent airway/ventilatory compromise, or tissue injury.    4.  Increased risk of obstructive sleep apnea: Recommend careful positioning to prevent airway compromise and close monitoring of the patient's respiratory status.    5.  Labs as ordered by TIFFANY Flores, plus UA.    Revised Cardiac Risk Index: 0.9% risk of major adverse cardiac event.  TOMASA risk: Low  PONV risk score= 3.  (If > 2, anti-emetic intervention is  recommended.)  Anesthesia considerations: Refer to PAC assessment in the anesthesia records.      Reviewed and Signed by PAC Mid-Level Provider/Resident  Mid-Level Provider/Resident: Marge Duncan CNP  Date: 11/27/18  Time:     Attending Anesthesiologist Anesthesia Assessment:  23 year old for lap gastric sleeve. As noted, some medical issues with pregnancy, but now stable, but still has IVC filter in place.     Agree with above assessment. No need to see patient. Patient is appropriate for the planned procedure without further work-up or medical management.      Reviewed and Signed by PAC Anesthesiologist  Anesthesiologist: isaías  Date: 11/27/2018  Time:   Pass/Fail: Pass  Disposition:     PAC Pharmacist Assessment:        Pharmacist:   Date:   Time:        Marge Duncan NP

## 2018-11-27 NOTE — MR AVS SNAPSHOT
After Visit Summary   2018    Graciela Christianson    MRN: [id number]           Patient Information     Date Of Birth          1995        Visit Information        Provider Department      2018 12:30 PM Marge Duncan NP [facility]        Today's Diagnoses     Preop examination    -  1      Care Instructions    Preparing for Your Surgery      Name:  Graciela Christianson   MRN:  [id number]   :  1995   Today's Date:  2018     Arriving for surgery:  Surgery date:  18  Arrival time:  10:50AM  Please come to:       [facility] [address]  [address] [place]  [address]    -   parking is available in front of the hospital from 5:15 am to 8:00 pm    -  Stop at the Information Desk in the lobby    -   Inform the information person that you are here for surgery. An escort to 3c will be provided. If you would not like an escort, please proceed to 3C on the 3rd floor. [phone]     What can I eat or drink?  -  You may have solid food or milk products until midnight 18 prior to your surgery. Begin Clear Liquid diet 18, follow instructions per bariatric surgery clinic.   -  The day of surgery you may have sips of water until 3 hours prior to your surgery. 18, 9:50AM    Which medicines can I take?  Stop Multivitamins one week prior to surgery.  Stop Phentermine 2 days prior to surgery.   Hold Ibuprofen for 24 hours prior to surgery.     How do I prepare myself?  -  Take two showers: one the night before surgery; and one the morning of surgery.         Use Scrubcare or Hibiclens to wash from neck down.  You may use your own shampoo and conditioner. No other hair products.   -  Do NOT use lotion, powder, deodorant, or antiperspirant the day of your surgery.  -  Do NOT wear any makeup, fingernail polish or jewelry.  -  Begin using Incentive Spirometer 1 week prior to surgery.  Use 4  times per day, up to 5 breaths each time.  Bring Incentive Spirometer to hospital.  - Do not bring your own medications to the hospital, except for inhalers and eye drops.  -  Bring your ID and insurance card.    Questions or Concerns:  -If you have questions or concerns regarding the day of surgery, please call [phone].     -For questions after surgery please call your surgeons office.           AFTER YOUR SURGERY  Breathing exercises   Breathing exercises help you recover faster. Take deep breaths and let the air out slowly. This will:     Help you wake up after surgery.    Help prevent complications like pneumonia.  Preventing complications will help you go home sooner.   We may give you a breathing device (incentive spirometer) to encourage you to breathe deeply.   Nausea and vomiting   You may feel sick to your stomach after surgery; if so, let your nurse know.    Pain control:  After surgery, you may have pain. Our goal is to help you manage your pain. Pain medicine will help you feel comfortable enough to do activities that will help you heal.  These activities may include breathing exercises, walking and physical therapy.   To help your health care team treat your pain we will ask: 1) If you have pain  2) where it is located 3) describe your pain in your words  Methods of pain control include medications given by mouth, vein or by nerve block for some surgeries.  Sequential Compression Device (SCD) or Pneumo Boots:  You may need to wear SCD S on your legs or feet. These are wraps connected to a machine that pumps in air and releases it. The repeated pumping helps prevent blood clots from forming.     Using an Incentive Spirometer    An incentive spirometer is a device that helps you do deep breathing exercises. These exercises expand your lungs, aid in circulation, and help prevent pneumonia. Deep breathing exercises also help you breathe better and improve the function of your lungs by:    Keeping your  lungs clear    Strengthening your breathing muscles    Helping prevent respiratory complications or problems  The incentive spirometer gives you a way to take an active part in your care. A nurse or therapist will teach you breathing exercises. To do these exercises, you will breathe in through your mouth and not your nose. The incentive spirometer only works correctly if you breathe in through your mouth.    Steps to clear lungs  Step 1. Exhale normally. Then, inhale normally.    Relax and breathe out.  Step 2. Place your lips tightly around the mouthpiece.    Make sure the device is upright and not tilted.  Step 3. Inhale as much air as you can through the mouthpiece (don't breath through your nose).    Inhale slowly and deeply.    Hold your breath long enough to keep the balls or disk raised for at least 3 to 5 seconds, or as instructed by your healthcare provider.  Step 4. Repeat the exercise regularly.  Begin using the Incentive Spirometer one week prior to your surgery, 4 times per day-5 times each.          Follow-ups after your visit        Your next 10 appointments already scheduled     Dec 11, 2018   Procedure with Yuri Hopper MD   [facility], [place], Same Day Surgery (--)    [address] [facility] [id number] [phone]              Future tests that were ordered for you today     Open Future Orders        Priority Expected Expires Ordered    UA reflex to Microscopic and Culture Routine 11/26/2018 12/26/2018 11/26/2018            Who to contact     Please call your clinic at [phone] to:    Ask questions about your health    Make or cancel appointments    Discuss your medicines    Learn about your test results    Speak to your doctor            Additional Information About Your Visit        trueAnthemhart Information     [employer] gives you secure access to your electronic health record. If you see a primary care provider, you can also send messages to your care team and make appointments. If you have  "questions, please call your primary care clinic.  If you do not have a primary care provider, please call [phone] and they will assist you.      [employer] is an electronic gateway that provides easy, online access to your medical records. With [employer], you can request a clinic appointment, read your test results, renew a prescription or communicate with your care team.     To access your existing account, please contact your [facility] or call [phone] for assistance.        Care EveryWhere ID     This is your Care EveryWhere ID. This could be used by other organizations to access your [facility] medical records  [id number]        Your Vitals Were     Pulse Temperature Respirations Height Pulse Oximetry BMI (Body Mass Index)    95 97.4  F (36.3  C) (Oral) 18 1.727 m (5' 8\") 99% 50.09 kg/m2       Blood Pressure from Last 3 Encounters:   11/27/18 124/85   10/11/18 136/75   08/10/18 138/68    Weight from Last 3 Encounters:   11/27/18 149.4 kg (329 lb 6.4 oz)   10/11/18 149.1 kg (328 lb 12.8 oz)   08/10/18 (!) 151.8 kg (334 lb 9.6 oz)               Primary Care Provider Office Phone # Fax #    Jorge Luis SIN Stephaniemickeyayanna [phone]       [place] [address]        Equal Access to Services     BRYN GAMBINO AH: Hadii aad ku hadasho Soomaali, waaxda luqadaha, qaybta kaalmada adeegyada, ericka aldana. So [facility] [phone].    ATENCIÓN: Si habla español, tiene a veronica disposición servicios gratuitos de asistencia lingüística. Jaleel al [phone].    We comply with applicable federal civil rights laws and [place] laws. We do not discriminate on the basis of race, color, national origin, age, disability, sex, sexual orientation, or gender identity.            Thank you!     Thank you for choosing [facility] PREOPERATIVE ASSESSMENT [place]  for your care. Our goal is always to provide you with excellent care. Hearing " back from our patients is one way we can continue to improve our services. Please take a few minutes to complete the written survey that you may receive in the mail after your visit with us. Thank you!             Your Updated Medication List - Protect others around you: Learn how to safely use, store and throw away your medicines at www.disposemymeds.org.          This list is accurate as of 11/27/18 12:44 PM.  Always use your most recent med list.                   Brand Name Dispense Instructions for use Diagnosis    ibuprofen 200 MG tablet    ADVIL/MOTRIN     Take 2 tablets by mouth as needed        MULTI-VITAMINS Tabs      Take 1 tablet by mouth every morning        phentermine 37.5 MG tablet    ADIPEX-P    15 tablet    Take 0.5 tablets (18.75 mg) by mouth every morning    Morbid obesity (H)          full range of motion in all extremities

## 2018-11-27 NOTE — H&P
Pre-Operative H & P     Date of Encounter: 2018  Primary Care Physician:  Jorge Luis Kirkland    CC: Morbid obesity.      HPI:  Graciela Christianson is a 23 year old female who presents for pre-operative H & P in preparation for Laparoscopic Sleeve Gastrectomy on 18 with Dr. Yuri Hopper at [facility].     The patient was first evaluated by the Bariatric Surgery Program on 17.  She reported that she has been overweight since puberty.  Despite attempts to lose weight with watching portions/calories, Weight Umanzor, and Slim-Fast, she has not been able to successfully lose a significant amount weight, nor maintain weight loss.  However, at that time, she decided against surgery.  More recently, she was evaluated on 18, and stated that she felt ready to resume the preparation process.  She was evaluated by Dr. Hopper on 10/11, and now that she has completed the preoperative requirements, arrangements are being made for the above procedure.    History is obtained from the patient and the electronic medical record.    Past Medical History:  Past Medical History:   Diagnosis Date     Anxiety and depression      Bone and joint disord back, pelvis, leg complicat preg, childb, puerp     hip issues when was 10     History of pulmonary embolism      Migraines      Morbid obesity (H) 2017     Presence of IVC filter      Past Surgical History:  Past Surgical History:   Procedure Laterality Date      SECTION       CHOLECYSTECTOMY       HIP SURGERY Bilateral     Slipped Capital Femoral Epiphysis      IR IVC FILTER PLACEMENT       Prior to admission medications  Current Outpatient Prescriptions   Medication Sig Dispense Refill     Multiple Vitamin (MULTI-VITAMINS) TABS Take 1 tablet by mouth every morning        phentermine (ADIPEX-P) 37.5 MG tablet Take 0.5 tablets (18.75 mg) by mouth every morning 15 tablet 3     ibuprofen (ADVIL/MOTRIN) 200 MG  tablet Take 2 tablets by mouth as needed        Allergies  Allergies   Allergen Reactions     Lactose Other (See Comments)     Epigastric pain  Other reaction(s): GI intolerance  Cerner list Epigastric pain  Other reaction(s): Other - Describe In Comment Field  Epigastric pain     No Clinical Screening - See Comments      Other reaction(s): Other (see comments)  Cerner list no reaction     Social History  Social History     Social History     Marital status: Single     Spouse name: N/A     Number of children: N/A     Years of education: N/A     Occupational History     Not on file.     Social History Main Topics     Smoking status: Never Smoker     Smokeless tobacco: Never Used     Alcohol use Yes      Comment: Twice a year.       Drug use: No     Sexual activity: Not on file     Other Topics Concern     Not on file     Social History Narrative     Family History  Family History   Problem Relation Age of Onset     Diabetes Paternal Grandfather      Hypertension Paternal Grandfather      Hyperlipidemia Father      Obesity Father      Colon Cancer Maternal Grandmother      Depression Paternal Grandmother      Anxiety Disorder Paternal Grandmother      Mental Illness Paternal Grandmother      Obesity Mother      Obesity Sister      Obesity Cousin      No Known Problems Maternal Grandfather      Heart Surgery Sister      Born with a hole in her heart     No Known Problems Sister      Ovarian Cancer Maternal Aunt      Hx of Blood transfusions/reactions: Denies.    Hx of abnormal bleeding or anti-platelet use: Denies.    Menstrual history: No LMP recorded. Patient is not currently having periods (Reason: IUD).    Steroid use in the last year: Denies.    Personal or FH of difficulty with anesthesia: Denies, but reports motion sickness.    Review of Systems  Functional status: Independent in ADL's.  >4 METS.     The complete review of systems is negative other than noted in the HPI or here.   Constitutional: Denies recent  "changes in sleeping patterns, or fevers/chills.  Reports a 42 pound intentional weight loss since June of this year.  Notes that her sleep is frequently interrupted due to her young children and the use of Phentermine.    Eyes: No recent vision changes.  EENT: Denies recent changes in hearing, mouth pain, or difficulty swallowing.  Cardiovascular: Denies chest pain, COLBY or orthopnea, or palpitations.  Respiratory: Denies shortness of breath or significant cough.    GI: Denies nausea/vomiting or diarrhea/constipation.  Reports some diarrhea related to the Phentermine.    : Denies dysuria.    Musculoskeletal: Denies joint pain or swelling.    Skin: Denies rashes or wounds.    Hematologic: Denies easy bruising or bleeding.    Neurologic: Denies seizures, dizziness, numbness/tingling.  Reports that her last migraine was 1 week ago.  Reports some lightheadedness with the use of Phentermine.    Psychiatric: Denies changes in mood or affect.      /85  Pulse 95  Temp 97.4  F (36.3  C) (Oral)  Resp 18  Ht 1.727 m (5' 8\")  Wt 149.4 kg (329 lb 6.4 oz)  SpO2 99%  BMI 50.09 kg/m2    329 lbs 6.4 oz  5' 8\"   Body mass index is 50.09 kg/(m^2).    Physical Exam  Constitutional: Patient awake, seated upright in a chair, in no apparent distress.  Appears stated age.  Eyes: Pupils equal, round and reactive to light.  Extra ocular muscles intact. Sclera clear.  Conjunctiva normal.  HENT: Head normocephalic.  Oral pharynx intact with moist mucous membranes.  Dentition intact.  No thyromegaly appreciated.   Respiratory: Lung sounds clear to auscultation bilaterally.  No rales, rhonchi, or wheezing noted.    Cardiovascular: S1, S2, regular rate and rhythm.  No murmurs, rubs, or gallops noted. Radial pulses palpable, bilaterally.  No edema noted.   GI: Bowel sounds present.  Abdomen obese, soft, non-tender to light palpation.     Genitourinary: Exam deferred.  Lymph/Hematologic: No cervical or supraclavicular lymphadenopathy " noted.  No excessive bruising noted.    Skin: Color appropriate for race, warm, dry.  No rashes or wounds at anticipated surgical site.   Musculoskeletal: Full extension of the neck.  No redness, warmth, or swelling of the joints noted. Gross motor strength is normal.    Neurologic: Alert, oriented to name, place and time. Cranial nerves II-XII are grossly intact. Gait is normal.      Neuropsychiatric: Calm, cooperative. Normal affect.       Labs:  11/27/18: WBC:  8.4; Hgb: 14.7; Hct: 44.8; Plt: 348  11/27/18: HgbA1c: 5.3      Lab results were personally reviewed by this provider.        Outside records from [facility] reviewed.      Assessment and Plan  Graciela Christianson is a 23 year old female scheduled to undergo Laparoscopic Sleeve Gastrectomy on 12/11/18 with Dr. Yuri Hopper.    She has the following specific operative considerations:   1.  Increased risk of postoperative nausea/vomiting with motion sickness: Recommend use of antiemetic agents in the perioperative period.    2.  History of PE during complicated twin pregnancy with preeclampsia and acute cholecystitis, IVC filter placed after the patient developed a rectus sheath hematoma: These events occurred in June of 2016, and while the patient is no longer an anticoagulation, the IVC filter was not removed as planned.  The patient was evaluated by Hematology at [place] on 8/24/18, who recommended that the IVC filter be left in place until she has recovered from the gastric bypass surgery, and that SCDs be in place intraoperatively.    3.  Obesity: Recommend careful positioning to prevent airway/ventilatory compromise, or tissue injury.    4.  Increased risk of obstructive sleep apnea: Recommend careful positioning to prevent airway compromise and close monitoring of the patient's respiratory status.    5.  Labs as ordered by TIFFANY Flores, plus UA.    Revised Cardiac Risk Index: 0.9% risk of major adverse cardiac event.  TOMASA risk: Low  PONV risk score= 3.   (If > 2, anti-emetic intervention is recommended.)  Anesthesia considerations: Refer to PAC assessment in the anesthesia records.    Marge Duncan NP  Preoperative Assessment Center  [facility]  Phone: [phone]  Fax: [phone]

## 2018-11-27 NOTE — PATIENT INSTRUCTIONS
Preparing for Your Surgery      Name:  Graciela Christianson   MRN:  [id number]   :  1995   Today's Date:  2018     Arriving for surgery:  Surgery date:  18  Arrival time:  10:50AM  Please come to:       [facility] [address]  [address] [place]  [address]    -   parking is available in front of the hospital from 5:15 am to 8:00 pm    -  Stop at the Information Desk in the lobby    -   Inform the information person that you are here for surgery. An escort to 3c will be provided. If you would not like an escort, please proceed to 3C on the 3rd floor. [phone]     What can I eat or drink?  -  You may have solid food or milk products until midnight 18 prior to your surgery. Begin Clear Liquid diet 18, follow instructions per bariatric surgery clinic.   -  The day of surgery you may have sips of water until 3 hours prior to your surgery. 18, 9:50AM    Which medicines can I take?  Stop Multivitamins one week prior to surgery.  Stop Phentermine 2 days prior to surgery.   Hold Ibuprofen for 24 hours prior to surgery.     How do I prepare myself?  -  Take two showers: one the night before surgery; and one the morning of surgery.         Use Scrubcare or Hibiclens to wash from neck down.  You may use your own shampoo and conditioner. No other hair products.   -  Do NOT use lotion, powder, deodorant, or antiperspirant the day of your surgery.  -  Do NOT wear any makeup, fingernail polish or jewelry.  -  Begin using Incentive Spirometer 1 week prior to surgery.  Use 4 times per day, up to 5 breaths each time.  Bring Incentive Spirometer to hospital.  - Do not bring your own medications to the hospital, except for inhalers and eye drops.  -  Bring your ID and insurance card.    Questions or Concerns:  -If you have questions or concerns regarding the day of surgery, please call [phone].     -For questions after surgery please call your  surgeons office.           AFTER YOUR SURGERY  Breathing exercises   Breathing exercises help you recover faster. Take deep breaths and let the air out slowly. This will:     Help you wake up after surgery.    Help prevent complications like pneumonia.  Preventing complications will help you go home sooner.   We may give you a breathing device (incentive spirometer) to encourage you to breathe deeply.   Nausea and vomiting   You may feel sick to your stomach after surgery; if so, let your nurse know.    Pain control:  After surgery, you may have pain. Our goal is to help you manage your pain. Pain medicine will help you feel comfortable enough to do activities that will help you heal.  These activities may include breathing exercises, walking and physical therapy.   To help your health care team treat your pain we will ask: 1) If you have pain  2) where it is located 3) describe your pain in your words  Methods of pain control include medications given by mouth, vein or by nerve block for some surgeries.  Sequential Compression Device (SCD) or Pneumo Boots:  You may need to wear SCD S on your legs or feet. These are wraps connected to a machine that pumps in air and releases it. The repeated pumping helps prevent blood clots from forming.     Using an Incentive Spirometer    An incentive spirometer is a device that helps you do deep breathing exercises. These exercises expand your lungs, aid in circulation, and help prevent pneumonia. Deep breathing exercises also help you breathe better and improve the function of your lungs by:    Keeping your lungs clear    Strengthening your breathing muscles    Helping prevent respiratory complications or problems  The incentive spirometer gives you a way to take an active part in your care. A nurse or therapist will teach you breathing exercises. To do these exercises, you will breathe in through your mouth and not your nose. The incentive spirometer only works correctly if  you breathe in through your mouth.    Steps to clear lungs  Step 1. Exhale normally. Then, inhale normally.    Relax and breathe out.  Step 2. Place your lips tightly around the mouthpiece.    Make sure the device is upright and not tilted.  Step 3. Inhale as much air as you can through the mouthpiece (don't breath through your nose).    Inhale slowly and deeply.    Hold your breath long enough to keep the balls or disk raised for at least 3 to 5 seconds, or as instructed by your healthcare provider.  Step 4. Repeat the exercise regularly.  Begin using the Incentive Spirometer one week prior to your surgery, 4 times per day-5 times each.

## 2018-11-28 LAB — DEPRECATED CALCIDIOL+CALCIFEROL SERPL-MC: 19 UG/L (ref 20–75)

## 2018-12-11 ENCOUNTER — ANESTHESIA (OUTPATIENT)
Dept: SURGERY | Facility: CLINIC | Age: 23
End: 2018-12-11
Payer: COMMERCIAL

## 2018-12-11 ENCOUNTER — HOSPITAL ENCOUNTER (INPATIENT)
Facility: CLINIC | Age: 23
LOS: 1 days | Discharge: HOME | End: 2018-12-12
Attending: SURGERY | Admitting: SURGERY
Payer: COMMERCIAL

## 2018-12-11 DIAGNOSIS — E66.01 MORBID (SEVERE) OBESITY DUE TO EXCESS CALORIES (H): Primary | ICD-10-CM

## 2018-12-11 LAB
CREAT SERPL-MCNC: 0.77 MG/DL (ref 0.52–1.04)
GFR SERPL CREATININE-BSD FRML MDRD: >90 ML/MIN/1.7M2
GLUCOSE BLDC GLUCOMTR-MCNC: 76 MG/DL (ref 70–99)
HCG UR QL: NEGATIVE
PLATELET # BLD AUTO: 244 10E9/L (ref 150–450)

## 2018-12-11 PROCEDURE — 82565 ASSAY OF CREATININE: CPT | Performed by: SURGERY

## 2018-12-11 PROCEDURE — 81025 URINE PREGNANCY TEST: CPT | Performed by: ANESTHESIOLOGY

## 2018-12-11 PROCEDURE — 25000128 H RX IP 250 OP 636: Performed by: SURGERY

## 2018-12-11 PROCEDURE — 25000132 ZZH RX MED GY IP 250 OP 250 PS 637: Performed by: SURGERY

## 2018-12-11 PROCEDURE — 85049 AUTOMATED PLATELET COUNT: CPT | Performed by: SURGERY

## 2018-12-11 PROCEDURE — 25000564 ZZH DESFLURANE, EA 15 MIN: Performed by: SURGERY

## 2018-12-11 PROCEDURE — 36000062 ZZH SURGERY LEVEL 4 1ST 30 MIN - UMMC: Performed by: SURGERY

## 2018-12-11 PROCEDURE — 71000015 ZZH RECOVERY PHASE 1 LEVEL 2 EA ADDTL HR: Performed by: SURGERY

## 2018-12-11 PROCEDURE — 37000008 ZZH ANESTHESIA TECHNICAL FEE, 1ST 30 MIN: Performed by: SURGERY

## 2018-12-11 PROCEDURE — 12000003 ZZH R&B CRITICAL UMMC

## 2018-12-11 PROCEDURE — 27210794 ZZH OR GENERAL SUPPLY STERILE: Performed by: SURGERY

## 2018-12-11 PROCEDURE — 40000556 ZZH STATISTIC PERIPHERAL IV START W US GUIDANCE

## 2018-12-11 PROCEDURE — 0DB64Z3 EXCISION OF STOMACH, PERCUTANEOUS ENDOSCOPIC APPROACH, VERTICAL: ICD-10-PCS | Performed by: SURGERY

## 2018-12-11 PROCEDURE — 88305 TISSUE EXAM BY PATHOLOGIST: CPT | Performed by: SURGERY

## 2018-12-11 PROCEDURE — 25000128 H RX IP 250 OP 636: Performed by: PHYSICIAN ASSISTANT

## 2018-12-11 PROCEDURE — 25000128 H RX IP 250 OP 636: Performed by: ANESTHESIOLOGY

## 2018-12-11 PROCEDURE — 71000014 ZZH RECOVERY PHASE 1 LEVEL 2 FIRST HR: Performed by: SURGERY

## 2018-12-11 PROCEDURE — 25000125 ZZHC RX 250: Performed by: SURGERY

## 2018-12-11 PROCEDURE — 25000125 ZZHC RX 250: Performed by: NURSE ANESTHETIST, CERTIFIED REGISTERED

## 2018-12-11 PROCEDURE — 40000170 ZZH STATISTIC PRE-PROCEDURE ASSESSMENT II: Performed by: SURGERY

## 2018-12-11 PROCEDURE — 36000064 ZZH SURGERY LEVEL 4 EA 15 ADDTL MIN - UMMC: Performed by: SURGERY

## 2018-12-11 PROCEDURE — 00000146 ZZHCL STATISTIC GLUCOSE BY METER IP

## 2018-12-11 PROCEDURE — 37000009 ZZH ANESTHESIA TECHNICAL FEE, EACH ADDTL 15 MIN: Performed by: SURGERY

## 2018-12-11 PROCEDURE — 36415 COLL VENOUS BLD VENIPUNCTURE: CPT | Performed by: SURGERY

## 2018-12-11 PROCEDURE — 25000128 H RX IP 250 OP 636: Performed by: NURSE ANESTHETIST, CERTIFIED REGISTERED

## 2018-12-11 RX ORDER — HYOSCYAMINE SULFATE 0.125 MG
125 TABLET ORAL EVERY 4 HOURS PRN
Status: DISCONTINUED | OUTPATIENT
Start: 2018-12-11 | End: 2018-12-12 | Stop reason: HOSPADM

## 2018-12-11 RX ORDER — BUPIVACAINE HYDROCHLORIDE 2.5 MG/ML
INJECTION, SOLUTION EPIDURAL; INFILTRATION; INTRACAUDAL PRN
Status: DISCONTINUED | OUTPATIENT
Start: 2018-12-11 | End: 2018-12-11 | Stop reason: HOSPADM

## 2018-12-11 RX ORDER — SODIUM CHLORIDE, SODIUM LACTATE, POTASSIUM CHLORIDE, CALCIUM CHLORIDE 600; 310; 30; 20 MG/100ML; MG/100ML; MG/100ML; MG/100ML
INJECTION, SOLUTION INTRAVENOUS CONTINUOUS
Status: DISCONTINUED | OUTPATIENT
Start: 2018-12-11 | End: 2018-12-12 | Stop reason: HOSPADM

## 2018-12-11 RX ORDER — PROCHLORPERAZINE MALEATE 5 MG
10 TABLET ORAL EVERY 6 HOURS PRN
Status: DISCONTINUED | OUTPATIENT
Start: 2018-12-11 | End: 2018-12-12 | Stop reason: HOSPADM

## 2018-12-11 RX ORDER — FENTANYL CITRATE 50 UG/ML
INJECTION, SOLUTION INTRAMUSCULAR; INTRAVENOUS PRN
Status: DISCONTINUED | OUTPATIENT
Start: 2018-12-11 | End: 2018-12-11

## 2018-12-11 RX ORDER — ONDANSETRON 4 MG/1
4 TABLET, ORALLY DISINTEGRATING ORAL EVERY 6 HOURS PRN
Status: DISCONTINUED | OUTPATIENT
Start: 2018-12-11 | End: 2018-12-12 | Stop reason: HOSPADM

## 2018-12-11 RX ORDER — HYDROMORPHONE HYDROCHLORIDE 1 MG/ML
0.5 INJECTION, SOLUTION INTRAMUSCULAR; INTRAVENOUS; SUBCUTANEOUS
Status: DISCONTINUED | OUTPATIENT
Start: 2018-12-11 | End: 2018-12-12 | Stop reason: HOSPADM

## 2018-12-11 RX ORDER — CEFAZOLIN SODIUM IN 0.9 % NACL 3 G/100 ML
3 INTRAVENOUS SOLUTION, PIGGYBACK (ML) INTRAVENOUS
Status: COMPLETED | OUTPATIENT
Start: 2018-12-11 | End: 2018-12-11

## 2018-12-11 RX ORDER — AMOXICILLIN 250 MG
2 CAPSULE ORAL 2 TIMES DAILY
Status: DISCONTINUED | OUTPATIENT
Start: 2018-12-11 | End: 2018-12-12 | Stop reason: HOSPADM

## 2018-12-11 RX ORDER — ONDANSETRON 4 MG/1
4 TABLET, ORALLY DISINTEGRATING ORAL EVERY 30 MIN PRN
Status: DISCONTINUED | OUTPATIENT
Start: 2018-12-11 | End: 2018-12-11 | Stop reason: HOSPADM

## 2018-12-11 RX ORDER — HYDROMORPHONE HYDROCHLORIDE 1 MG/ML
.3-.5 INJECTION, SOLUTION INTRAMUSCULAR; INTRAVENOUS; SUBCUTANEOUS EVERY 5 MIN PRN
Status: DISCONTINUED | OUTPATIENT
Start: 2018-12-11 | End: 2018-12-11 | Stop reason: HOSPADM

## 2018-12-11 RX ORDER — FENTANYL CITRATE 50 UG/ML
25-50 INJECTION, SOLUTION INTRAMUSCULAR; INTRAVENOUS
Status: DISCONTINUED | OUTPATIENT
Start: 2018-12-11 | End: 2018-12-11 | Stop reason: HOSPADM

## 2018-12-11 RX ORDER — KETOROLAC TROMETHAMINE 30 MG/ML
30 INJECTION, SOLUTION INTRAMUSCULAR; INTRAVENOUS
Status: COMPLETED | OUTPATIENT
Start: 2018-12-11 | End: 2018-12-11

## 2018-12-11 RX ORDER — PHENTERMINE HYDROCHLORIDE 37.5 MG/1
18.75 TABLET ORAL
Status: DISCONTINUED | OUTPATIENT
Start: 2018-12-12 | End: 2018-12-12 | Stop reason: CLARIF

## 2018-12-11 RX ORDER — LIDOCAINE 40 MG/G
CREAM TOPICAL
Status: DISCONTINUED | OUTPATIENT
Start: 2018-12-11 | End: 2018-12-11 | Stop reason: HOSPADM

## 2018-12-11 RX ORDER — SODIUM CHLORIDE, SODIUM LACTATE, POTASSIUM CHLORIDE, CALCIUM CHLORIDE 600; 310; 30; 20 MG/100ML; MG/100ML; MG/100ML; MG/100ML
INJECTION, SOLUTION INTRAVENOUS CONTINUOUS
Status: DISCONTINUED | OUTPATIENT
Start: 2018-12-11 | End: 2018-12-11 | Stop reason: HOSPADM

## 2018-12-11 RX ORDER — NALOXONE HYDROCHLORIDE 0.4 MG/ML
.1-.4 INJECTION, SOLUTION INTRAMUSCULAR; INTRAVENOUS; SUBCUTANEOUS
Status: DISCONTINUED | OUTPATIENT
Start: 2018-12-11 | End: 2018-12-12 | Stop reason: HOSPADM

## 2018-12-11 RX ORDER — LIDOCAINE HYDROCHLORIDE 20 MG/ML
INJECTION, SOLUTION INFILTRATION; PERINEURAL PRN
Status: DISCONTINUED | OUTPATIENT
Start: 2018-12-11 | End: 2018-12-11

## 2018-12-11 RX ORDER — METOPROLOL TARTRATE 1 MG/ML
1-2 INJECTION, SOLUTION INTRAVENOUS EVERY 5 MIN PRN
Status: DISCONTINUED | OUTPATIENT
Start: 2018-12-11 | End: 2018-12-11 | Stop reason: HOSPADM

## 2018-12-11 RX ORDER — LIDOCAINE 40 MG/G
CREAM TOPICAL
Status: DISCONTINUED | OUTPATIENT
Start: 2018-12-11 | End: 2018-12-12 | Stop reason: HOSPADM

## 2018-12-11 RX ORDER — ONDANSETRON 2 MG/ML
4 INJECTION INTRAMUSCULAR; INTRAVENOUS EVERY 30 MIN PRN
Status: DISCONTINUED | OUTPATIENT
Start: 2018-12-11 | End: 2018-12-11 | Stop reason: HOSPADM

## 2018-12-11 RX ORDER — ONDANSETRON 2 MG/ML
4 INJECTION INTRAMUSCULAR; INTRAVENOUS EVERY 6 HOURS PRN
Status: DISCONTINUED | OUTPATIENT
Start: 2018-12-11 | End: 2018-12-12 | Stop reason: HOSPADM

## 2018-12-11 RX ORDER — SCOLOPAMINE TRANSDERMAL SYSTEM 1 MG/1
1 PATCH, EXTENDED RELEASE TRANSDERMAL
Status: DISCONTINUED | OUTPATIENT
Start: 2018-12-11 | End: 2018-12-12 | Stop reason: HOSPADM

## 2018-12-11 RX ORDER — ONDANSETRON 2 MG/ML
INJECTION INTRAMUSCULAR; INTRAVENOUS PRN
Status: DISCONTINUED | OUTPATIENT
Start: 2018-12-11 | End: 2018-12-11

## 2018-12-11 RX ORDER — ACETAMINOPHEN 325 MG/1
650 TABLET ORAL EVERY 4 HOURS PRN
Status: DISCONTINUED | OUTPATIENT
Start: 2018-12-11 | End: 2018-12-12 | Stop reason: HOSPADM

## 2018-12-11 RX ORDER — CEFAZOLIN SODIUM 1 G/3ML
1 INJECTION, POWDER, FOR SOLUTION INTRAMUSCULAR; INTRAVENOUS SEE ADMIN INSTRUCTIONS
Status: DISCONTINUED | OUTPATIENT
Start: 2018-12-11 | End: 2018-12-11 | Stop reason: HOSPADM

## 2018-12-11 RX ORDER — HYDRALAZINE HYDROCHLORIDE 20 MG/ML
2.5-5 INJECTION INTRAMUSCULAR; INTRAVENOUS EVERY 10 MIN PRN
Status: DISCONTINUED | OUTPATIENT
Start: 2018-12-11 | End: 2018-12-11 | Stop reason: HOSPADM

## 2018-12-11 RX ORDER — DEXAMETHASONE SODIUM PHOSPHATE 10 MG/ML
INJECTION, SOLUTION INTRAMUSCULAR; INTRAVENOUS PRN
Status: DISCONTINUED | OUTPATIENT
Start: 2018-12-11 | End: 2018-12-11

## 2018-12-11 RX ORDER — NALOXONE HYDROCHLORIDE 0.4 MG/ML
.1-.4 INJECTION, SOLUTION INTRAMUSCULAR; INTRAVENOUS; SUBCUTANEOUS
Status: DISCONTINUED | OUTPATIENT
Start: 2018-12-11 | End: 2018-12-11

## 2018-12-11 RX ORDER — DIPHENHYDRAMINE HYDROCHLORIDE 50 MG/ML
25 INJECTION INTRAMUSCULAR; INTRAVENOUS EVERY 6 HOURS PRN
Status: DISCONTINUED | OUTPATIENT
Start: 2018-12-11 | End: 2018-12-12 | Stop reason: HOSPADM

## 2018-12-11 RX ORDER — PROPOFOL 10 MG/ML
INJECTION, EMULSION INTRAVENOUS PRN
Status: DISCONTINUED | OUTPATIENT
Start: 2018-12-11 | End: 2018-12-11

## 2018-12-11 RX ORDER — OXYCODONE HYDROCHLORIDE 5 MG/1
5-10 TABLET ORAL
Status: DISCONTINUED | OUTPATIENT
Start: 2018-12-11 | End: 2018-12-12 | Stop reason: HOSPADM

## 2018-12-11 RX ORDER — DIPHENHYDRAMINE HCL 25 MG
25 CAPSULE ORAL EVERY 6 HOURS PRN
Status: DISCONTINUED | OUTPATIENT
Start: 2018-12-11 | End: 2018-12-12 | Stop reason: HOSPADM

## 2018-12-11 RX ADMIN — Medication 0.5 MG: at 15:55

## 2018-12-11 RX ADMIN — SUGAMMADEX 200 MG: 100 INJECTION, SOLUTION INTRAVENOUS at 14:29

## 2018-12-11 RX ADMIN — HYDROMORPHONE HYDROCHLORIDE 0.5 MG: 1 INJECTION, SOLUTION INTRAMUSCULAR; INTRAVENOUS; SUBCUTANEOUS at 14:00

## 2018-12-11 RX ADMIN — SODIUM CHLORIDE, POTASSIUM CHLORIDE, SODIUM LACTATE AND CALCIUM CHLORIDE: 600; 310; 30; 20 INJECTION, SOLUTION INTRAVENOUS at 13:41

## 2018-12-11 RX ADMIN — DEXAMETHASONE SODIUM PHOSPHATE 8 MG: 10 INJECTION, SOLUTION INTRAMUSCULAR; INTRAVENOUS at 13:56

## 2018-12-11 RX ADMIN — FENTANYL CITRATE 50 MCG: 50 INJECTION INTRAMUSCULAR; INTRAVENOUS at 15:17

## 2018-12-11 RX ADMIN — FENTANYL CITRATE 50 MCG: 50 INJECTION INTRAMUSCULAR; INTRAVENOUS at 15:07

## 2018-12-11 RX ADMIN — FENTANYL CITRATE 100 MCG: 50 INJECTION, SOLUTION INTRAMUSCULAR; INTRAVENOUS at 13:41

## 2018-12-11 RX ADMIN — Medication 3 G: at 13:45

## 2018-12-11 RX ADMIN — ONDANSETRON 4 MG: 2 INJECTION INTRAMUSCULAR; INTRAVENOUS at 13:56

## 2018-12-11 RX ADMIN — FENTANYL CITRATE 50 MCG: 50 INJECTION, SOLUTION INTRAMUSCULAR; INTRAVENOUS at 14:26

## 2018-12-11 RX ADMIN — SENNOSIDES AND DOCUSATE SODIUM 2 TABLET: 8.6; 5 TABLET ORAL at 20:13

## 2018-12-11 RX ADMIN — ONDANSETRON HYDROCHLORIDE 4 MG: 2 INJECTION, SOLUTION INTRAMUSCULAR; INTRAVENOUS at 14:53

## 2018-12-11 RX ADMIN — Medication 0.5 MG: at 16:19

## 2018-12-11 RX ADMIN — FENTANYL CITRATE 50 MCG: 50 INJECTION, SOLUTION INTRAMUSCULAR; INTRAVENOUS at 14:35

## 2018-12-11 RX ADMIN — PROPOFOL 50 MG: 10 INJECTION, EMULSION INTRAVENOUS at 14:02

## 2018-12-11 RX ADMIN — PROCHLORPERAZINE EDISYLATE 10 MG: 5 INJECTION INTRAMUSCULAR; INTRAVENOUS at 15:46

## 2018-12-11 RX ADMIN — KETOROLAC TROMETHAMINE 30 MG: 30 INJECTION, SOLUTION INTRAMUSCULAR at 15:36

## 2018-12-11 RX ADMIN — ROCURONIUM BROMIDE 50 MG: 10 INJECTION INTRAVENOUS at 13:41

## 2018-12-11 RX ADMIN — ENOXAPARIN SODIUM 40 MG: 40 INJECTION SUBCUTANEOUS at 12:38

## 2018-12-11 RX ADMIN — PROPOFOL 150 MG: 10 INJECTION, EMULSION INTRAVENOUS at 13:41

## 2018-12-11 RX ADMIN — Medication 0.5 MG: at 15:24

## 2018-12-11 RX ADMIN — SCOPOLAMINE 1 PATCH: 1 PATCH, EXTENDED RELEASE TRANSDERMAL at 20:13

## 2018-12-11 RX ADMIN — FENTANYL CITRATE 50 MCG: 50 INJECTION, SOLUTION INTRAMUSCULAR; INTRAVENOUS at 14:41

## 2018-12-11 RX ADMIN — MIDAZOLAM 2 MG: 1 INJECTION INTRAMUSCULAR; INTRAVENOUS at 13:30

## 2018-12-11 RX ADMIN — FENTANYL CITRATE 50 MCG: 50 INJECTION, SOLUTION INTRAMUSCULAR; INTRAVENOUS at 14:18

## 2018-12-11 RX ADMIN — LIDOCAINE HYDROCHLORIDE 60 MG: 20 INJECTION, SOLUTION INFILTRATION; PERINEURAL at 13:41

## 2018-12-11 RX ADMIN — SODIUM CHLORIDE, POTASSIUM CHLORIDE, SODIUM LACTATE AND CALCIUM CHLORIDE: 600; 310; 30; 20 INJECTION, SOLUTION INTRAVENOUS at 15:24

## 2018-12-11 ASSESSMENT — ACTIVITIES OF DAILY LIVING (ADL)
TRANSFERRING: 0-->INDEPENDENT
AMBULATION: 0-->INDEPENDENT
COGNITION: 0 - NO COGNITION ISSUES REPORTED
FALL_HISTORY_WITHIN_LAST_SIX_MONTHS: NO
RETIRED_EATING: 0-->INDEPENDENT
SWALLOWING: 0-->SWALLOWS FOODS/LIQUIDS WITHOUT DIFFICULTY
DRESS: 0-->INDEPENDENT
BATHING: 0-->INDEPENDENT
RETIRED_COMMUNICATION: 0-->UNDERSTANDS/COMMUNICATES WITHOUT DIFFICULTY
ADLS_ACUITY_SCORE: 10
PRIOR_FUNCTIONAL_LEVEL_COMMENT: INDEPENDENT
TOILETING: 0-->INDEPENDENT

## 2018-12-11 ASSESSMENT — PAIN DESCRIPTION - DESCRIPTORS
DESCRIPTORS: ACHING

## 2018-12-11 ASSESSMENT — MIFFLIN-ST. JEOR: SCORE: 2292.2

## 2018-12-11 NOTE — ANESTHESIA CARE TRANSFER NOTE
Patient: Graciela Christianson    Procedure(s):  Laparoscopic Sleeve Gastrectomy    Diagnosis: Morbid Obesity   Diagnosis Additional Information: No value filed.    Anesthesia Type:   No value filed.     Note:    Patient transferred to:PACU  Comments: Anesthesia Care Transfer Note    Patient: Graciela Christianson    Transferred to: PACU with supplemental O2    Patient vital signs: stable    Airway: none    Monitors on, VSS, breathing spontaneously, report to CM Gibbons CRNA   12/11/2018 2:39 PMHandoff Report: Identifed the Patient, Identified the Reponsible Provider, Reviewed the pertinent medical history, Discussed the surgical course, Reviewed Intra-OP anesthesia mangement and issues during anesthesia, Set expectations for post-procedure period and Allowed opportunity for questions and acknowledgement of understanding      Vitals: (Last set prior to Anesthesia Care Transfer)    CRNA VITALS  12/11/2018 1405 - 12/11/2018 1438      12/11/2018             Pulse:  99    SpO2:  100 %    Resp Rate (set):  10                Electronically Signed By: NATHANAEL Burger CRNA  December 11, 2018  2:38 PM

## 2018-12-11 NOTE — LETTER
Transition Communication Hand-off for Care Transitions to Next Level of Care Provider    Name: Graciela Christianson  : 1995  MRN #: [id number]  Primary Care Provider: Jorge Luis Kirkland     Primary Clinic: [address] [address]     Reason for Hospitalization:  Morbid Obesity   Morbid (severe) obesity due to excess calories (H)  Admit Date/Time: 2018  9:35 AM  Discharge Date: 2018  Payor Source: Payor: [employer] / Plan: [employer] / Product Type: PPO /        Reason for Communication Hand-off Referral: Other continuity of care     Discharge Plan: discharged to home with plan for clinic f/u     Follow-up plan:    Future Appointments   Date Time Provider Department Center   2018  9:00 AM Bridgett Gomez RD [facility]   2018  9:30 AM Jeanine Ford, CORN[facility]     Rosangela Holland, RNCC  [phone]    AVS/Discharge Summary is the source of truth; this is a helpful guide for improved communication of patient story

## 2018-12-11 NOTE — ANESTHESIA POSTPROCEDURE EVALUATION
Anesthesia POST Procedure Evaluation    Patient: Graciela Christianson   MRN:     [id number] Gender:   female   Age:    23 year old :      1995        Preoperative Diagnosis: Morbid Obesity    Procedure(s):  Laparoscopic Sleeve Gastrectomy   Postop Comments: No value filed.       Anesthesia Type:  General    Reportable Event: NO     PAIN: Uncomplicated   Sign Out status: Comfortable, Well controlled pain     PONV: No PONV   Sign Out status:  No Nausea or Vomiting     Neuro/Psych: Uneventful perioperative course   Sign Out Status: Preoperative baseline; Age appropriate mentation     Airway/Resp.: Uneventful perioperative course   Sign Out Status: Non labored breathing, age appropriate RR; Resp. Status within EXPECTED Parameters     CV: Uneventful perioperative course   Sign Out status: Appropriate BP and perfusion indices; Appropriate HR/Rhythm     Disposition:   Sign Out in:  PACU  Disposition:  Phase II; Home  Recovery Course: Uneventful  Follow-Up: Not required           Last Anesthesia Record Vitals:  CRNA VITALS  2018 1405 - 2018 1505      2018             Pulse:  99    SpO2:  100 %    Resp Rate (set):  10          Last PACU/Preop Vitals:  Vitals:    18 1007 18 1440   BP: 119/73    Pulse: 71    Resp: 18 14   Temp: 35.9  C (96.6  F) 36.4  C (97.6  F)   SpO2: 99%          Electronically Signed By: Gael Warner MD, 2018, 3:05 PM

## 2018-12-11 NOTE — OP NOTE
[facility] [place]    Operative Note    Pre-operative diagnosis: Morbid Obesity    Post-operative diagnosis * No post-op diagnosis entered *   Procedure: Procedure(s):  Laparoscopic Sleeve Gastrectomy   Surgeon: Surgeon(s) and Role:     * Yuri Hopper MD - Primary   Assistant Surgeon      Anesthesia: Osito Ojeda MD; [please note that Dr. Ojeda was scrubbed and assisted during the operation due to the unavailability of a qualified surgical resident.]  General    Estimated blood loss: Minimal   Drains: None   Specimens: * No specimens in log *   Findings: no hiatal hernia.   Complications: None.   Implants: None.         BOUGIE SIZE: 40 FR  DISTANCE FROM PYLORUS: 10 CM  STAPLE LINE REINFORCEMENT: NO  STAPLE LINE OVERSEW: NO  COMORBIDITIES:   Past Medical History:   Diagnosis Date     Anxiety and depression      Bone and joint disord back, pelvis, leg complicat preg, childb, puerp     hip issues when was 10     History of pulmonary embolism      Migraines      Morbid obesity (H) 2017     Presence of IVC filter        INDICATIONS FOR PROCEDURE  Graciela Christianson is a 23 year old female who is morbidly obese.  Numerous weight loss attempts without surgery have been without success.     After understanding the risks and benefits of proceeding with a laparoscopic vertical sleeve gastrectomy, she agreed to an operation as outlined by me.    I reviewed the risks of surgery with Graciela Christianson.    These include, but are not limited to, death, myocardial infarction, pneumonia, urinary tract infection, deep venous thrombosis with or without pulmonary embolus, abdominal infection from bowel injury or abscess, bowel obstruction, wound infection, and bleeding.    More specific risks related to vertical sleeve gastrectomy were detailed at the bariatric informational seminar and include the followin.) leak at the vertical sleeve staple line, 2.) stricture in the sleeve, 3.)  "nausea, vomiting, and dehydration for several months, 4.) adhesions causing bowel obstruction, 5.) rapid weight loss causing a higher rate of gallstone formation during the first 6 months after surgery, 6.) decreased absorption of vitamins because of the reduced stomach size, 7.) weight regain if inappropriate food intake occurs.    The BMI that we are treating this patient for was measured at the initial consultation visit in our bariatric program and it was: 56.1 kg/m2 (as calculated just below).      The initial consult height, weight, and BMI are as follows:    Height: 5' 8\"   BARIATRIC WEIGHT TRACKING 10/11/2018   Initial Weight 369 lbs       Our weight loss surgery program requires weight loss prior to bariatric surgery and currently the height, weight, and BMI are as follows:    Height: 172.7 cm (5' 8\"), Weight: 149.4 kg (329 lb 4.8 oz), and currently the Body mass index is 50.07 kg/m .    Due to the patient's comorbidity conditions of anxiety, depression, and PE, in association with elevated body mass index, bariatric surgery has been recommended and is being performed today.    Moreover, as the surgeon performing this procedure, I certify that the following are true in regards to this patient at or prior to the day of surgery:    1. The patient's body mass index (BMI) is or has been greater than or equal to 35 kg/m2.  2. The patient has at least one co-morbidity related to obesity (as outlined above).  3. The patient has been previously unsuccessful with medical treatment for obesity.  Please note that some of this information has been documented as part of a comprehensive pre-bariatric surgery process in the outpatient clinic and is NOT immediately available in the inpatient encounter for this bariatric operation.      OPERATIVE PROCEDURE:     Graciela Christianson was brought to the operating room and prepared in routine fashion. Under the benefits of general anesthesia, a left upper quadrant Veress " needle was inserted and pneumoperitoneum was established using carbon dioxide gas to a maximum pressure of 15 mmHg. A total of five ports were placed into the abdomen.     A liver retractor was placed through the rightmost port and this provided a view of the upper stomach. The operation was started by dividing the short gastric vessels off the greater curvature of the stomach. This dissection was carried up to the angle of His, and ligasure dissector was used for hemostasis.     A bougie (size noted above) was passed into the stomach and I used 5 blue loads of the [employer] Watson flex linear cutter stapler device to create a vertical sleeve gastrectomy with the bougie as a template. The bougie was removed.    The sleeve gastrectomy specimen (partial gastrectomy) was now removed from the abdomen through the 15 mm port.     Hemostasis was secured, and the liver retractor and all ports were removed from the abdomen under direct visualization.     Skin incisions were closed using skin staples, and sterile dressings were placed.     I was present for all critical components of the operation and all needle and sponge counts were correct x2 at the end of the procedure.    Yuri Hopper MD  Surgery  [phone] (hospital )

## 2018-12-12 VITALS
DIASTOLIC BLOOD PRESSURE: 77 MMHG | TEMPERATURE: 98.2 F | OXYGEN SATURATION: 99 % | HEART RATE: 71 BPM | BODY MASS INDEX: 44.41 KG/M2 | SYSTOLIC BLOOD PRESSURE: 143 MMHG | HEIGHT: 68 IN | WEIGHT: 293 LBS | RESPIRATION RATE: 16 BRPM

## 2018-12-12 LAB — GLUCOSE BLDC GLUCOMTR-MCNC: 96 MG/DL (ref 70–99)

## 2018-12-12 PROCEDURE — 25000132 ZZH RX MED GY IP 250 OP 250 PS 637: Performed by: SURGERY

## 2018-12-12 PROCEDURE — 25000128 H RX IP 250 OP 636: Performed by: SURGERY

## 2018-12-12 PROCEDURE — 00000146 ZZHCL STATISTIC GLUCOSE BY METER IP

## 2018-12-12 RX ORDER — OXYCODONE HYDROCHLORIDE 5 MG/1
5-10 TABLET ORAL
Qty: 30 TABLET | Refills: 0 | Status: SHIPPED | OUTPATIENT
Start: 2018-12-12 | End: 2019-04-08

## 2018-12-12 RX ORDER — HYOSCYAMINE SULFATE 0.125 MG
125 TABLET ORAL EVERY 4 HOURS PRN
Qty: 30 TABLET | Refills: 1 | Status: SHIPPED | OUTPATIENT
Start: 2018-12-12 | End: 2019-04-08

## 2018-12-12 RX ORDER — ONDANSETRON 4 MG/1
4 TABLET, ORALLY DISINTEGRATING ORAL EVERY 6 HOURS PRN
Qty: 30 TABLET | Refills: 1 | Status: SHIPPED | OUTPATIENT
Start: 2018-12-12 | End: 2019-04-08

## 2018-12-12 RX ADMIN — SENNOSIDES AND DOCUSATE SODIUM 2 TABLET: 8.6; 5 TABLET ORAL at 10:30

## 2018-12-12 RX ADMIN — PROCHLORPERAZINE EDISYLATE 10 MG: 5 INJECTION INTRAMUSCULAR; INTRAVENOUS at 14:01

## 2018-12-12 RX ADMIN — ENOXAPARIN SODIUM 40 MG: 40 INJECTION SUBCUTANEOUS at 11:46

## 2018-12-12 RX ADMIN — ONDANSETRON HYDROCHLORIDE 4 MG: 2 INJECTION, SOLUTION INTRAMUSCULAR; INTRAVENOUS at 11:46

## 2018-12-12 RX ADMIN — PROCHLORPERAZINE EDISYLATE 10 MG: 5 INJECTION INTRAMUSCULAR; INTRAVENOUS at 04:13

## 2018-12-12 RX ADMIN — SODIUM CHLORIDE, POTASSIUM CHLORIDE, SODIUM LACTATE AND CALCIUM CHLORIDE: 600; 310; 30; 20 INJECTION, SOLUTION INTRAVENOUS at 03:54

## 2018-12-12 RX ADMIN — Medication 0.5 MG: at 00:16

## 2018-12-12 RX ADMIN — MENTHOL 1 PATCH: 205.5 PATCH TOPICAL at 11:46

## 2018-12-12 RX ADMIN — Medication 20 MG: at 10:30

## 2018-12-12 RX ADMIN — ONDANSETRON HYDROCHLORIDE 4 MG: 2 INJECTION, SOLUTION INTRAMUSCULAR; INTRAVENOUS at 02:46

## 2018-12-12 ASSESSMENT — ACTIVITIES OF DAILY LIVING (ADL)
ADLS_ACUITY_SCORE: 10

## 2018-12-12 NOTE — DISCHARGE SUMMARY
"[facility] Discharge Summary    Graciela Christianson MRN: [id number]   YOB: 1995 Age: 23 year old     Date of Admission:  12/11/2018  Date of Discharge::  12/12/2018  Admitting Physician:  Yuri Hopper MD  Discharge Physician:  Yuri Hopper MD  Primary Care Physician:         Jorge Luis Kirkland          Admission Diagnoses:   Morbid (severe) obesity due to excess calories (H)           Discharge Diagnosis:   Morbid (severe) obesity due to excess calories (H)         Procedures:   Laparoscopic Sleeve Gastrectomy, 12/11/2018 by Dr. Yuri Hopper          Consultations:   PHARMACY BARIATRIC IP CONSULT  RESPIRATORY CARE IP CONSULT          HPI (from H&P):   Graciela Christianson is a 23 year old female who is morbidly obese. Numerous weight loss attempts without surgery have been without success.   Our weight loss surgery program requires weight loss prior to bariatric surgery and currently the height, weight, and BMI are as follows:  Height: 172.7 cm (5' 8\"), Weight: 149.4 kg (329 lb 4.8 oz), and currently the Body mass index is 50.07 kg/m .  Due to the patient's comorbidity conditions of anxiety, depression, and PE, in association with elevated body mass index, bariatric surgery has been recommended.           Hospital Course:   The patient was admitted and underwent the above procedure. The patient tolerated the procedure well. There were no complications. Postoperatively the patient was transferred to the general floor for further care. All staples were removed on POD1. The patient's diet was slowly advanced as bowel function returned. Pain was controlled with oral pain medication and the patient was able to ambulate and void without difficulty. The patient received appropriate education post operatively. On POD 1 the patient was discharged to home with appropriate instructions and follow up. The patient acknowledged understanding and were in agreement with the " plan.         Final Pathology Result:   No tissues were sent.          Pertinent Imaging Results:   Not applicable         Medications Prior to Admission:     Medications Prior to Admission   Medication Sig Dispense Refill Last Dose     ibuprofen (ADVIL/MOTRIN) 200 MG tablet Take 2 tablets by mouth as needed    Taking     Multiple Vitamin (MULTI-VITAMINS) TABS Take 1 tablet by mouth every morning    Taking     [DISCONTINUED] phentermine (ADIPEX-P) 37.5 MG tablet Take 0.5 tablets (18.75 mg) by mouth every morning 15 tablet 3 Taking            Discharge Medications:     Current Discharge Medication List      START taking these medications    Details   enoxaparin (LOVENOX) 40 MG/0.4ML syringe Inject 0.4 mLs (40 mg) Subcutaneous every 24 hours for 14 days  Qty: 5.6 mL, Refills: 0    Associated Diagnoses: Morbid (severe) obesity due to excess calories (H)      hyoscyamine (ANASPAZ/LEVSIN) 0.125 MG tablet Take 1 tablet (125 mcg) by mouth every 4 hours as needed for cramping (spasms)  Qty: 30 tablet, Refills: 1    Associated Diagnoses: Morbid (severe) obesity due to excess calories (H)      omeprazole (PRILOSEC) 2 mg/mL suspension Take 10 mLs (20 mg) by mouth 2 times daily  Qty: 600 mL, Refills: 1    Associated Diagnoses: Morbid (severe) obesity due to excess calories (H)      ondansetron (ZOFRAN-ODT) 4 MG ODT tab Take 1 tablet (4 mg) by mouth every 6 hours as needed for nausea or vomiting  Qty: 30 tablet, Refills: 1    Associated Diagnoses: Morbid (severe) obesity due to excess calories (H)      oxyCODONE (ROXICODONE) 5 MG tablet Take 1-2 tablets (5-10 mg) by mouth every 3 hours as needed  Qty: 30 tablet, Refills: 0    Associated Diagnoses: Morbid (severe) obesity due to excess calories (H)         CONTINUE these medications which have NOT CHANGED    Details   ibuprofen (ADVIL/MOTRIN) 200 MG tablet Take 2 tablets by mouth as needed       Multiple Vitamin (MULTI-VITAMINS) TABS Take 1 tablet by mouth every morning           STOP taking these medications       phentermine (ADIPEX-P) 37.5 MG tablet Comments:   Reason for Stopping:                    Day of Discharge Physical Exam:   Temp:  [97.2  F (36.2  C)-98.2  F (36.8  C)] 98.2  F (36.8  C)  Heart Rate:  [] 86  Resp:  [13-18] 16  BP: (134-148)/(65-80) 143/77  SpO2:  [94 %-100 %] 99 %  General: A&O, NAD, lying comfortably in bed  Chest: NLB on RA  Abd: Soft, ND, NT, lap incision sites c/d/i w/ steri strips  Extremities: WWP  Neuro: Moving all extremities spontaneously without apparent deficit         Discharge Instructions and Follow-Up:   - No lifting greater than 15 pounds for 8 weeks after surgery.   - You may shower and get incisions wet starting 48 hrs after surgery but do not scrub incisions or submerge wounds (aka, bath, pool, hot tub, ect.) for 2 weeks. Remove outer dressing (if placed) after 48 hrs from surgery. If dermabond was used, avoid applying any lotions or ointments. If steri-strips were used, they will fall off on their own. You may leave open to air or cover with dry gauze if needed for comfort.  - No driving while taking narcotic pain medications.   - If you develop constipation, take stool softeners such as colace or miralax but stop if you develop diarrhea. Wean yourself off of narcotics.   - Call your primary provider to touch base with them regarding your recent admission.   - If you develop any fever/chills, worsening pain, redness, swelling, or drainage from your wound please call (Clinic [phone]).            Follow-Up:       - Follow up with Dr. Hopper in clinic in 2 week(s) after discharge. You should be called to make an appointment within 3 business days. If you are not contacted, call [phone] to make an appointment    - - - - - - - - - - - - - - - - - -  Eugene Feng MD, MS3    ----  I was present with the medical student who participated in the service and in the documentation of the note.  I have verified the history and  personally performed the physical exam and medical decision making. Addenda have been made to the note as appropriate.  I agree with the assessment and plan of care as documented in the note.    Eugene Feng MD  Surgery PGY-1  p: [phone]

## 2018-12-12 NOTE — PROGRESS NOTES
Updated team on pt's poor po intake due to nausea. Zofran iv given @1140 after emesis. Pt not want to take much po as she is worried about been nauseated on her way home. Had Rx for Zofran for home. Gave compazine at 1400. Per team pt is cleared for discharge home. Talked to pt and she feels ready to discharge home. Discussed discharge orders and after care summary instructions given to pt.. Pt has done subcutaneous Lovenox before. Written instruction/infomation on Lovenox given to pt. Rx ready in discharge  pharmacy. Declined WC ride to pharmacy and front door. Spouse here to assist with discharge. Discharge home at 1430.

## 2018-12-12 NOTE — PROGRESS NOTES
"Post Op Check    S: Patient is sleepy but interactive. She is having some pain, but it is adequately controlled with pain meds. She had some nausea immediately post-op but this was well controlled with meds. No emesis. Denies CP/SOB. Has not been OOB yet. Afebrile, HDS.      O:  /69 (BP Location: Left arm)   Pulse 71   Temp 97.7  F (36.5  C) (Oral)   Resp 16   Ht 1.727 m (5' 8\")   Wt 149.4 kg (329 lb 4.8 oz)   SpO2 99%   BMI 50.07 kg/m      Gen: NAD, A&O  CV: RRR  Resp: Unlabored breathing on 2L NC  Abd: Obese, soft, non-distended, attp, laparoscopic incisions x5 w/ island bandaged in place, minimal serosang srike-through  Ext: SCDs on, WWP    A/P: 22 YO F w/ hx of morbid obesity POD0 s/p laparoscopic sleeve gastrectomy. Doing well and recovering appropriately.    -Continue plan of care per primary day team    Cris Hinton PGY1  Surgery   "

## 2018-12-12 NOTE — PHARMACY-CONSULT NOTE
Bariatric Consult    Medications evaluated as requested per Bariatric Consult. Patient may swallow tablets/capsules ONLY if less than   inch.  Larger tablets/capsules should be broken, crushed or split.    No medication changes were needed based on the Bariatric Medication Management Policy.    The pharmacist will continue to follow as new medications are ordered.    Effie Blandon, KhariD

## 2018-12-12 NOTE — PLAN OF CARE
"VS:  Vital signs:  Temp: 97.2  F (36.2  C) Temp src: Oral BP: 134/66 Pulse: 71 Heart Rate: 107 Resp: 17 SpO2: 96 % O2 Device: Nasal cannula Oxygen Delivery: 2 LPM Height: 172.7 cm (5' 8\") Weight: 149.4 kg (329 lb 4.8 oz)  Estimated body mass index is 50.07 kg/m  as calculated from the following:    Height as of this encounter: 1.727 m (5' 8\").    Weight as of this encounter: 149.4 kg (329 lb 4.8 oz).    Neuro: A&Ox4  Cardiac: WDL  Resp: lung sounds clear, sating well on 2L NC, no SOB reported  GI: not passing gas, bowel sounds audible in all quadrants  : voiding spontaneously   Skin: 5 lap sites with primapore dressings intact, small amount of drainage on 2 lap sites marked on dressing  Pain/Nausea: no pain or nausea this shift   LDA: R PIV infusing LR 75mL/hr, L PIV saline locked   Diet: bariatric clears   Mobility: up with SBA   Labs: reviewed   Plan: advance diet, continue plan of care     "

## 2018-12-12 NOTE — PHARMACY
"The following home medications were NOT continued on inpatient admission per \"Discontinuation of nonessential home medications during hospitalization\" policy: phentermine    If a therapeutic holiday is deemed inappropriate per the prescriber, please notify the pharmacist regarding the medication order.    The pharmacist is available to answer any questions and/or concerns the patient may have regarding discontinuation of non-essential medications.    Please ensure that these medications are restarted as needed upon discharge via the medication reconciliation discharge process and included on the discharge medication reconciliation report.    Thank you,    Katt Sandoval   Pager: [phone] (text capable)    "

## 2018-12-12 NOTE — PLAN OF CARE
"/73 (BP Location: Left arm)   Pulse 71   Temp 98.1  F (36.7  C) (Oral)   Resp 13   Ht 1.727 m (5' 8\")   Wt 149.4 kg (329 lb 4.8 oz)   SpO2 95%   BMI 50.07 kg/m       Neuro: Alert and oriented x4.    GI/: No BM. Passing gas. Abdomen soft with hypoactive BS. Voiding.    Diet: Bariatric full liquid diet.    Incisions/Drains: Las sites with steri strips and MICHELLE.    IV Access: PIV with MIV at 75cc per hr.    Labs: None    Activity: Up walking on unit independently.    Pain: Well controled with ICY Hot patches. Has PRN oxycodone and scheduled tylenol. None used this shift.    New changes this shift: C/o nausea. Emesis this shift. Poor po intake.     Plan: Continue to monitor po intake. Medicate for nausea as needed. Discharge home when pt has better nausea control and po intake. Continue with current POC.     "

## 2018-12-12 NOTE — PLAN OF CARE
"/65 (BP Location: Left arm)   Pulse 71   Temp 97.4  F (36.3  C) (Oral)   Resp 17   Ht 1.727 m (5' 8\")   Wt 149.4 kg (329 lb 4.8 oz)   SpO2 96%   BMI 50.07 kg/m      Shift: 6619-9740  Neuro: A/Ox4.   Cardiac: VSS.   Respiratory: Weaned to room air. Sats > 92%.   GI/: Nauseated overnight did have 50cc emesis. Scop patch in place, PRN zofran and compazine given with relief. SeaBands, fan, wet wash cloth and aromatherapy utilized. Voiding spontaneously adequate amounts.   Diet/Appetite: Bariatric clear diet. Nauseated, intermittently able to take sips.   Activity: Up with SBA.   Pain: Acceptable on current regimen.   Skin: Lap sites covered with primapore. CDI.   LDA's: PIV with MIVF.     Plan: Will continue to monitor per POC.     "

## 2018-12-12 NOTE — PROVIDER NOTIFICATION
"/65 (BP Location: Left arm)   Pulse 71   Temp 97.4  F (36.3  C) (Oral)   Resp 17   Ht 1.727 m (5' 8\")   Wt 149.4 kg (329 lb 4.8 oz)   SpO2 96%   BMI 50.07 kg/m      4:30 AM  Provider notified: Leslie Vera   Reason for notification: Pt continues to have nausea despite zofran compazine and scopolamine patch. 50cc emesis.   Orders: OK to give 4mg zofran again if pt continues to have nausea.         "

## 2018-12-13 ENCOUNTER — CARE COORDINATION (OUTPATIENT)
Dept: SURGERY | Facility: CLINIC | Age: 23
End: 2018-12-13

## 2018-12-13 NOTE — PROGRESS NOTES
"     Bariatric Surgery Post op Call      72 Hour Post-Op Follow Up:     Ms. Graciela Christianson is a 23 year old female who underwent sleeve on 18 with Dr. Hopper for a history of Obesity.  Spoke with Patient.    Support  Patient able to care for self independently    Coughing/Deep Breathing:yes  Pain ratin-5    (If patient needs additional pain medication and Tylenol is not contraindicated, then ok  advise patient to take ES Tylenol 2 tablets every 6 hours while symptoms last, not to exceed 6 caplets in 24 hours).    Leg swelling: no  Nausea/Vomiting: yes  Passing flatus: yes  Having BM s: no  Are you taking your chewable multivitamin:no  Activity Level: walking  Fluid Intake: 16oz Confirm drinking full liquid diet  Concerns: just nausea/voimting- discussed with Dr. Hopper.     Junie removed prior to discharge: yes    Taking any weight loss medications prior to surgery? Stopped prior to surgery    Do you have any questions about medications that you were discharged on from the hospital?  no        Activity/Restrictions  Patient following lifting restrictions.        Activity/Restrictions  Patient following lifting restrictions. and Following restrictions outlined by surgeon.     Whom and When to Call  Patient acknowledges understanding of how to manage any medication changes and when to seek medical care.      Patient advised that if after hour medical concerns arise to please call [phone] and choose option 4 to speak to the physician on call.      How was your hospital stay/concerns/positive/negative? Good, nurse were\"vipul Nursing assistant\" was the bomb\"!    Confirm with patient their follow-up appointment date and time? yes     Time spent with patient: 10     "

## 2018-12-14 LAB — COPATH REPORT: NORMAL

## 2018-12-18 ENCOUNTER — CARE COORDINATION (OUTPATIENT)
Dept: SURGERY | Facility: CLINIC | Age: 23
End: 2018-12-18

## 2018-12-18 DIAGNOSIS — E86.0 DEHYDRATION: Primary | ICD-10-CM

## 2018-12-18 DIAGNOSIS — Z98.84 STATUS POST BARIATRIC SURGERY: ICD-10-CM

## 2018-12-18 NOTE — PROGRESS NOTES
Called patient back and left a message after discussing with Jeanine UNGER and we will have her take zofran and push fluids along with scheduling Infusion in case patient needs tomorrow. Will call patient tomorrow and check on her. She will also be coming in to see Dr. Hopper.    Sent patient a [employer] message with numbers and instructions. Along with calling and leaving all information on voicemail. Left my direct number.

## 2018-12-18 NOTE — PROGRESS NOTES
Patient states she has been able to double intake at least 32-36 is able to do full liquids. Nausea is gone. Vomited one time but believes it was do to drinking to fast.    She stated that was doing well but today is having off day. She was drinking water and then vomited and feeling like her heart is racing. Has only been able to get in about 8 oz.    Advised patient to take the Zofran and drink water slowly and will discuss with Jeanine UNGER and call back with plan.

## 2018-12-20 ENCOUNTER — OFFICE VISIT (OUTPATIENT)
Dept: SURGERY | Facility: CLINIC | Age: 23
End: 2018-12-20
Payer: COMMERCIAL

## 2018-12-20 VITALS
HEART RATE: 95 BPM | DIASTOLIC BLOOD PRESSURE: 74 MMHG | TEMPERATURE: 97.4 F | BODY MASS INDEX: 44.41 KG/M2 | OXYGEN SATURATION: 98 % | HEIGHT: 68 IN | SYSTOLIC BLOOD PRESSURE: 133 MMHG | WEIGHT: 293 LBS

## 2018-12-20 DIAGNOSIS — E66.01 MORBID OBESITY (H): ICD-10-CM

## 2018-12-20 RX ORDER — PHENTERMINE HYDROCHLORIDE 37.5 MG/1
0.5 TABLET ORAL EVERY MORNING
Qty: 15 TABLET | Refills: 3 | Status: SHIPPED | OUTPATIENT
Start: 2018-12-20 | End: 2019-04-02

## 2018-12-20 ASSESSMENT — MIFFLIN-ST. JEOR: SCORE: 2204.65

## 2018-12-20 ASSESSMENT — PAIN SCALES - GENERAL: PAINLEVEL: NO PAIN (0)

## 2018-12-20 NOTE — NURSING NOTE
"Chief Complaint   Patient presents with     Surgical Followup     2 weeks follow up        Vitals:    12/20/18 0838   BP: 133/74   Pulse: 95   Temp: 97.4  F (36.3  C)   TempSrc: Oral   SpO2: 98%   Weight: 140.6 kg (310 lb)   Height: 1.727 m (5' 8\")       Body mass index is 47.14 kg/m .      Reji Null, EMT on 12/20/2018 at 8:41 AM                          "

## 2018-12-20 NOTE — LETTER
"12/20/2018       RE: Graciela Christianson  [address] [facility] [address]     Dear Colleague,    Thank you for referring your patient, Graciela Christianson, to the [facility] SURGICAL WEIGHT MANAGEMENT at [facility]. Please see a copy of my visit note below.    Postoperative bariatric surgery visit.    Patient underwent sleeve gastrectomy 2 weeks ago.      Tolerating liquids: y (40-50 oz)  Lightheadedness: n  Abdominal pain: n  Bowel movements: y  Fevers/shakes/chills:     /74   Pulse 95   Temp 97.4  F (36.3  C) (Oral)   Ht 1.727 m (5' 8\")   Wt 140.6 kg (310 lb)   SpO2 98%   BMI 47.14 kg/m     NAD  Overall looks well  Incisions c/d/i; s/nt/nd    Plan:  1. RD visit today.  2. Start vitamin supplements per RD directions.  3. Advance diet per RD directions.  4. Resume preop phentermine  5. Follow-up: 1 month FU .      Physician Attestation  I, Yuri Hopper, saw and evaluated this patient as part of a shared visit.  I have reviewed and discussed with the advanced practice provider and/or resident their history, physical and plan.    I personally reviewed the vital signs, medications, labs and imaging.    My key history or physical exam findings: overall doing fine; much improved in the past few days    Key management decisions made by me: f/u RD.    Yuri Hopper MD  Date of Service (when I saw the patient): 12/20/2018      "

## 2018-12-20 NOTE — PATIENT INSTRUCTIONS
Post-op Goals:   1. Follow the bariatric post-op diet advancement schedule:  - Bariatric full liquid diet for 2 weeks ( 12/25/18)   - Transition to the Pureed diet 2 weeks.  2. Sip on 48-64 oz (or greater) fluids daily, recording intake to help stay on-track.  - Drink at least 2 oz of fluid every 30 min.  3. Drink at least 1 protein shake per day  4. Limit portions to 1/4-1/2 c   5. Start Multivitamin with iron 2x daily ([place] complete)     Follow up with RD in 3 weeks     Margaret Galvin, MS, RD, LD  If you need to schedule or reschedule with a dietitian please call [phone].

## 2018-12-20 NOTE — PROGRESS NOTES
Nutrition Assessment  Reason For Visit:  Graciela Christianson is a 23 year old female presenting today for nutrition follow-up, 1 week s/p Sleeve gastrectomy with Dr. Hopper on (12/11/18) .    Anthropometrics  Initial Consult Weight: 369 lbs  Day of Surgery Weight:149.4 kg (329 lbs 4.8 oz)  Current Weight:310 lbs  Weight loss: -59 lbs from initial consult; -10 from day of surgery    Current Vitamins/Minerals: None started after surgery, starting multivitamin today.     Nutrition History  Pt reports consuming and tolerating bariatric clear and low-fat full liquid diets. She is having a difficult time with her protein shakes and states that she feels really full. She is not consistently meeting the recommended 48-64 oz of fluids daily.      Nutrition Prescription:  Grams Protein: 60 (minimum)  Amount of Fluid: 48-64 oz    Nutrition Diagnosis  Food and nutrition-related knowledge deficit r/t lack of prior exposure to diet advancements beyond bariatric low-fat full liquid diet aeb pt unable to verbalize understanding of bariatric pureed and soft diets.    Intervention  Intervention At Appointment:  Materials/education provided on bariatric pureed and soft diets, protein intake, fluid intake, eating pace, portion control, avoiding excess sugar and fat, recommended vitamin/mineral supplements.    Patient Understanding: Good   Expected Compliance: Good     Goals:  1) Follow bariatric low-fat full liquid diet through day 13 post-op, then to progress to pureed diet x 2 weeks.  If tolerating, may advance on day 29 post-op to bariatric soft diet.   2) Work towards 60 gm protein/day.  3) Consume 48-64 oz fluids daily- between meals.  4) Eat slowly (>20 min/meal), chewing well to smooth consistency once on the bariatric soft diet.  5) Limit portions to 1/2 cup/meal.  6) Start chewable/liquid multivitamin/minerals twice daily.      Follow-Up: 3 weeks     Time spent with patient: 30 minutes.  Margaret Galvin, MS, RD, LD

## 2018-12-20 NOTE — LETTER
12/20/2018       RE: Graciela Christianson  [address] [address]     Dear Colleague,    Thank you for referring your patient, Graciela Christianson, to the [facility] SURGICAL WEIGHT MANAGEMENT at [facility] Please see a copy of my visit note below.    Nutrition Assessment  Reason For Visit:  Graciela Christianson is a 23 year old female presenting today for nutrition follow-up, 1 week s/p Sleeve gastrectomy with Dr. Hopper on (12/11/18) .    Anthropometrics  Initial Consult Weight: 369 lbs  Day of Surgery Weight:149.4 kg (329 lbs 4.8 oz)  Current Weight:310 lbs  Weight loss: -59 lbs from initial consult; -10 from day of surgery    Current Vitamins/Minerals: None started after surgery, starting multivitamin today.     Nutrition History  Pt reports consuming and tolerating bariatric clear and low-fat full liquid diets. She is having a difficult time with her protein shakes and states that she feels really full. She is not consistently meeting the recommended 48-64 oz of fluids daily.      Nutrition Prescription:  Grams Protein: 60 (minimum)  Amount of Fluid: 48-64 oz    Nutrition Diagnosis  Food and nutrition-related knowledge deficit r/t lack of prior exposure to diet advancements beyond bariatric low-fat full liquid diet aeb pt unable to verbalize understanding of bariatric pureed and soft diets.    Intervention  Intervention At Appointment:  Materials/education provided on bariatric pureed and soft diets, protein intake, fluid intake, eating pace, portion control, avoiding excess sugar and fat, recommended vitamin/mineral supplements.    Patient Understanding: Good   Expected Compliance: Good     Goals:  1) Follow bariatric low-fat full liquid diet through day 13 post-op, then to progress to pureed diet x 2 weeks.  If tolerating, may advance on day 29 post-op to bariatric soft diet.   2) Work towards 60 gm protein/day.  3) Consume 48-64 oz fluids daily-  between meals.  4) Eat slowly (>20 min/meal), chewing well to smooth consistency once on the bariatric soft diet.  5) Limit portions to 1/2 cup/meal.  6) Start chewable/liquid multivitamin/minerals twice daily.      Follow-Up: 3 weeks     Time spent with patient: 30 minutes.  Margaret Galvin, MS, RD, LD

## 2019-04-02 DIAGNOSIS — E66.01 MORBID OBESITY (H): ICD-10-CM

## 2019-04-02 RX ORDER — PHENTERMINE HYDROCHLORIDE 37.5 MG/1
0.5 TABLET ORAL EVERY MORNING
Qty: 15 TABLET | Refills: 0 | Status: SHIPPED | OUTPATIENT
Start: 2019-04-02 | End: 2019-04-08

## 2019-04-02 RX ORDER — PHENTERMINE HYDROCHLORIDE 37.5 MG/1
0.5 TABLET ORAL EVERY MORNING
Qty: 15 TABLET | Refills: 0 | Status: CANCELLED | OUTPATIENT
Start: 2019-04-02

## 2019-04-02 NOTE — TELEPHONE ENCOUNTER
Refill request from pharmacy received after patient made appointment. Will refill and route to provider for sign off.

## 2019-04-03 NOTE — TELEPHONE ENCOUNTER
Phentermine refill called [facility] [place] with no refills. Patient has appointment with Jeanine Ford on 04/08/2019.

## 2019-04-08 ENCOUNTER — OFFICE VISIT (OUTPATIENT)
Dept: SURGERY | Facility: CLINIC | Age: 24
End: 2019-04-08
Payer: COMMERCIAL

## 2019-04-08 VITALS
WEIGHT: 275.7 LBS | DIASTOLIC BLOOD PRESSURE: 70 MMHG | HEIGHT: 68 IN | HEART RATE: 79 BPM | OXYGEN SATURATION: 98 % | BODY MASS INDEX: 41.79 KG/M2 | TEMPERATURE: 98.6 F | SYSTOLIC BLOOD PRESSURE: 129 MMHG

## 2019-04-08 DIAGNOSIS — Z98.84 BARIATRIC SURGERY STATUS: ICD-10-CM

## 2019-04-08 DIAGNOSIS — E66.01 MORBID OBESITY (H): ICD-10-CM

## 2019-04-08 DIAGNOSIS — Z98.84 BARIATRIC SURGERY STATUS: Primary | ICD-10-CM

## 2019-04-08 LAB
ALBUMIN SERPL-MCNC: 4 G/DL (ref 3.4–5)
ALP SERPL-CCNC: 76 U/L (ref 40–150)
ALT SERPL W P-5'-P-CCNC: 19 U/L (ref 0–50)
ANION GAP SERPL CALCULATED.3IONS-SCNC: 6 MMOL/L (ref 3–14)
AST SERPL W P-5'-P-CCNC: 15 U/L (ref 0–45)
BILIRUB SERPL-MCNC: 0.8 MG/DL (ref 0.2–1.3)
BUN SERPL-MCNC: 10 MG/DL (ref 7–30)
CALCIUM SERPL-MCNC: 9.1 MG/DL (ref 8.5–10.1)
CHLORIDE SERPL-SCNC: 103 MMOL/L (ref 94–109)
CO2 SERPL-SCNC: 26 MMOL/L (ref 20–32)
CREAT SERPL-MCNC: 0.74 MG/DL (ref 0.52–1.04)
ERYTHROCYTE [DISTWIDTH] IN BLOOD BY AUTOMATED COUNT: 13.3 % (ref 10–15)
GFR SERPL CREATININE-BSD FRML MDRD: >90 ML/MIN/{1.73_M2}
GLUCOSE SERPL-MCNC: 80 MG/DL (ref 70–99)
HBA1C MFR BLD: 5.1 % (ref 0–5.6)
HCT VFR BLD AUTO: 42 % (ref 35–47)
HGB BLD-MCNC: 13.6 G/DL (ref 11.7–15.7)
MCH RBC QN AUTO: 28.5 PG (ref 26.5–33)
MCHC RBC AUTO-ENTMCNC: 32.4 G/DL (ref 31.5–36.5)
MCV RBC AUTO: 88 FL (ref 78–100)
PLATELET # BLD AUTO: 277 10E9/L (ref 150–450)
POTASSIUM SERPL-SCNC: 3.7 MMOL/L (ref 3.4–5.3)
PROT SERPL-MCNC: 8.2 G/DL (ref 6.8–8.8)
PTH-INTACT SERPL-MCNC: 116 PG/ML (ref 18–80)
RBC # BLD AUTO: 4.78 10E12/L (ref 3.8–5.2)
SODIUM SERPL-SCNC: 135 MMOL/L (ref 133–144)
VIT B12 SERPL-MCNC: 533 PG/ML (ref 193–986)
WBC # BLD AUTO: 8.6 10E9/L (ref 4–11)

## 2019-04-08 RX ORDER — PHENTERMINE HYDROCHLORIDE 37.5 MG/1
0.5 TABLET ORAL EVERY MORNING
Qty: 15 TABLET | Refills: 3 | Status: SHIPPED | OUTPATIENT
Start: 2019-04-08 | End: 2020-01-02

## 2019-04-08 ASSESSMENT — PAIN SCALES - GENERAL: PAINLEVEL: NO PAIN (0)

## 2019-04-08 ASSESSMENT — MIFFLIN-ST. JEOR: SCORE: 2048.94

## 2019-04-08 NOTE — NURSING NOTE
"(   Chief Complaint   Patient presents with     RECHECK     RBS S/P LSG 12/11/18    )    ( Weight: 125.1 kg (275 lb 11.2 oz) )  ( Height: 172.7 cm (5' 7.99\") )  ( BMI (Calculated): 41.93 )  ( Initial Weight: 167.4 kg (369 lb) )  ( Cumulative weight loss (lbs): 93.3 )  ( Last Visits Weight: 149.1 kg (328 lb 12.8 oz) )  ( Wt change since last visit (lbs): -53.1 )  (   )  (   )    ( BP: 129/70 )  (   )  ( Temp: 98.6  F (37  C) )  ( Temp src: Oral )  ( Pulse: 79 )  (   )  ( SpO2: 98 % )    (   Patient Active Problem List   Diagnosis     Morbid obesity (H)     Morbid (severe) obesity due to excess calories (H)     Status post bariatric surgery     Dehydration    )  (   Current Outpatient Medications   Medication Sig Dispense Refill     Multiple Vitamin (MULTI-VITAMINS) TABS Take 1 tablet by mouth every morning        phentermine (ADIPEX-P) 37.5 MG tablet Take 0.5 tablets (18.75 mg) by mouth every morning 15 tablet 0     ibuprofen (ADVIL/MOTRIN) 200 MG tablet Take 2 tablets by mouth as needed       )  ( Diabetes Eval:    )    ( Pain Eval:  No Pain (0) )    ( Wound Eval:       )    (   History   Smoking Status     Never Smoker   Smokeless Tobacco     Never Used    )    ( Signed By:  Sharonda Almeida; April 8, 2019; 3:18 PM )    "

## 2019-04-08 NOTE — PROGRESS NOTES
Return Bariatric Surgery Note    RE: Graciela Christianson  MR#: [id number]  : 1995  VISIT DATE: 2019    Dear Jorge Luis Kirkland,    I had the pleasure of seeing your patient, Graciela Christianson, in my post-bariatric surgery assessment clinic.    CHIEF COMPLAINT: Post-bariatric surgery follow-up    HISTORY OF PRESENT ILLNESS:  Questions Regarding Prior Weight Loss Surgery Reviewed With Patient 2019   I had the following weight loss procedure: Sleeve Gastrectomy   What year was your surgery? 2018   How has your weight changed since your last visit? I have lost weight   Are you currently taking any weight loss medications? No   Do you currently have any of the following: None of the above   Have you been to the Emergency room since your last visit with us? No   Were you in the hospital since your last visit with us? No   Do you have any concerns today? no     Sleeve gastrectomy 18. Seen at one week post op but then not since    Has been working on listening to when she is full and not over eat. Will vomit with over eating. No dysphasia otherwise.  No heart burn-stopped omeprazole  No abdominal pain  incisions healing well  Constipation regularly now- getting plenty of water, no issues pre-surgery, miralax twice weekly - BM very irregular    Some struggles with depression and coping with eating since surgery. Working with therapist. Working on stress/ emotional/boredome eating.    Weight History:     2019   What is your highest lifetime weight? 375   What is your lowest weight since surgery? (In pounds) 269     Initial Weight: 167.4 kg (369 lb)  Current Weight: Weight: 125.1 kg (275 lb 11.2 oz)  Cumulative weight loss (lbs): 93.3  Last Visits Weight: 149.1 kg (328 lb 12.8 oz)  Date of surgery 329lb    Questions Regarding Co-Morbidities and Health Concerns Reviewed With Patient 2019   Pre-diabetes: Never   Diabetes II: Never   High Blood Pressure: Never   High cholesterol: Never    Heartburn/Reflux: Improved   Sleep apnea: Never   PCOS: Never   Back pain: Never   Joint pain: Improved   Lower leg swelling: Improved     Can be more active now  Signed up for 5k in June- friend is pushing her with this    Eating Habits 4/8/2019   How many meals do you eat per day? 3   Do you snack between meals? Sometimes   How much food are you eating at each meal? 1/2 cup to 1 cup   Are you able to separate your meals and liquids by at least 30 minutes? Sometimes   Are you able to avoid liquid calories? Sometimes       Exercise Questions Reviewed With Patient 4/8/2019   How often do you exercise? 3 to 4 times per week   What is the duration of your exercise (in minutes)? 45 Minutes   What types of exercise do you do? walking, gym membership, climbing stairs at work   What keeps you from being more active?  Worried people will look at me     Feels more able to avoid snacks and boredom eating when taking 1/2 tab of phentermine. Taking phentermine occasionally. Took before surgery with success. Notes has also tried topiramate which didn't notice any benefit.     Social History:      4/8/2019   Are you smoking? No   Are you drinking alcohol? No       Medications:  Current Outpatient Medications   Medication     Multiple Vitamin (MULTI-VITAMINS) TABS     phentermine (ADIPEX-P) 37.5 MG tablet     ibuprofen (ADVIL/MOTRIN) 200 MG tablet     No current facility-administered medications for this visit.          4/8/2019   Do you avoid NSAIDs such as (Ibuprofen, Aleve, Naproxen, Advil)?   No       ROS:  GI:      4/8/2019   Vomiting: No   Diarrhea: No   Constipation: Yes   Swallowing trouble: No   Abdominal pain: No   Heartburn: No   Rash in skin folds: No   Depression: Yes   Stress urinary incontinence No     Skin:   BAR RBS ROS - SKIN 4/8/2019   Rash in skin folds: No     Psych:      4/8/2019   Depression: Yes   Anxiety: Yes   working with therapist    Female Only:   [facility] RBS ROS - FEMALE ONLY 4/8/2019   Female only:  "Birth control       LABS/IMAGING/MEDICAL RECORDS REVIEW:     PHYSICAL EXAMINATION:  /70 (BP Location: Left arm, Patient Position: Chair, Cuff Size: Adult Large)   Pulse 79   Temp 98.6  F (37  C) (Oral)   Ht 1.727 m (5' 7.99\")   Wt 125.1 kg (275 lb 11.2 oz)   SpO2 98%   Breastfeeding? No   BMI 41.93 kg/m     General: No apparent distress  Neuro: A & O x 3  Head: Atraumatic, normocephalic  Skin: warm and dry, no rashes on exposed skin  Respiratory: respirations unlabored  Abdomen: soft, non-tender  Extremities: No LE swelling      ASSESSMENT AND PLAN:      1. 4 months status laparoscopic gastric sleeve  2. Morbid Obesity current BMI: Body mass index is 41.93 kg/m .  3. Post surgical malabsorption:   Labs ordered per protocol.   Follow food plan per dietitian recommendations.   Continue taking recommended post-op vitamins.  4. Return to clinic in 3-4 months.  5. Discussed pros and cons with continuing phentermine long term, off label, for weight management. Refilled today. Will continue to monitor. Patient feels it is helping currently.   6. Discussed vitamins and supplements with myself and dietitian, [facility]    Pathology normal after surgery  S/p porsha     Sincerely,    NATHANAEL Higuera CNP   I spent a total of 30 minutes face to face with Graciela during today's office visit. Over 50% of this time was spent counseling the patient and/or coordinating care.  "

## 2019-04-08 NOTE — PROGRESS NOTES
Nutrition Assessment  Reason For Visit:  Graciela Christianson is a 23 year old female presenting today for nutrition follow-up, 4 month s/p Sleeve gastrectomy with Dr. Hopper on (12/11/18) . Pt referred by Jeanine Ford NP (4/8/19).     Anthropometrics  Initial Consult Weight: 369 lbs  Day of Surgery Weight:149.4 kg (329 lbs 4.8 oz)  Current Weight:275.7 lbs  Weight loss: - 93.3 lbs from initial consult; -53.6 lbs from day of surgery    Current Vitamins/Minerals: Multivitamin    Nutrition History  Diet Recall:   B: Protein shake breakfast   L: Working through lunch: snacks on 2 cheese sicks   D:Grilled chicken with vegetable   Snack: none   Beverages: water with flavoring, beverages (was drinking mountain dew, now not).     Progress toward previous goals:  1) Follow bariatric low-fat full liquid diet through day 13 post-op, then to progress to pureed diet x 2 weeks.  If tolerating, may advance on day 29 post-op to bariatric soft diet. -Met   2) Work towards 60 gm protein/day.-Continues   3) Consume 48-64 oz fluids daily- between meals.-Met   4) Eat slowly (>20 min/meal), chewing well to smooth consistency once on the bariatric soft diet.-Met   5) Limit portions to 1/2 cup/meal.-Met   6) Start chewable/liquid multivitamin/minerals twice daily.-Met      GI: constipated easily.     Exercising: 3 x week and walk on the treadmill or on the bike ~45 minutes.    Nutrition Prescription:  Grams Protein: 60 (minimum)  Amount of Fluid: 48-64 oz    Nutrition Diagnosis  Previous: Food and nutrition-related knowledge deficit r/t lack of prior exposure to diet advancements beyond bariatric low-fat full liquid diet aeb pt unable to verbalize understanding of bariatric pureed and soft diets.    Current: Obesity r/t long history of self-monitoring deficit and excessive energy intake aeb BMI >30    Intervention  Materials/Education provided on bariatric regular consistency diets, protein intake, fluid intake, eating pace, chewing  foods well, portion control, sugar/fat intake, recommended vitamin/mineral supplements.   - Focused on supplements as pt was only taking a multivitamin at her 3 month post-op visit.     Patient Understanding: Good   Expected Compliance: Good     Goals:    Goals:  1) Continue to follow the bariatric regular diet   2) Pt to consume 60 gm protein/day  3) Pt to consume 48-64 oz fluids/day- between meals only  4)Eat slowly (>20 min/meal), chewing well to smooth consistency once on the bariatric regular diet.  5) Limit portions to 1/2 cup/meal  6) *Start 500 mg Calcium Citrate + vitamin D TID and vitamin D3 (5,000 international unit(s)) and vitamin B12  7) check on calorie/protein level of the protein shake at health bar     *Protein Shake Criteria: no more than 210 Calories, at least 20 grams of protein, and less than 10 grams of sugar     Follow-Up: 2 months      Time spent with patient: 30 minutes.  Margaret Galvin, MS, RD, LD

## 2019-04-08 NOTE — LETTER
2019       RE: Graciela Christianson  [address] [address]     Dear Colleague,    Thank you for referring your patient, Graciela Christianson, to the [facility] SURGICAL WEIGHT MANAGEMENT at [facility]. Please see a copy of my visit note below.    Return Bariatric Surgery Note    RE: Graciela Christianson  MR#: [id number]  : 1995  VISIT DATE: 2019    Dear Jorge Luis Kirkland,    I had the pleasure of seeing your patient, Graciela Christianson, in my post-bariatric surgery assessment clinic.    CHIEF COMPLAINT: Post-bariatric surgery follow-up    HISTORY OF PRESENT ILLNESS:  Questions Regarding Prior Weight Loss Surgery Reviewed With Patient 2019   I had the following weight loss procedure: Sleeve Gastrectomy   What year was your surgery? 2018   How has your weight changed since your last visit? I have lost weight   Are you currently taking any weight loss medications? No   Do you currently have any of the following: None of the above   Have you been to the Emergency room since your last visit with us? No   Were you in the hospital since your last visit with us? No   Do you have any concerns today? no     Sleeve gastrectomy 18. Seen at one week post op but then not since    Has been working on listening to when she is full and not over eat. Will vomit with over eating. No dysphasia otherwise.  No heart burn-stopped omeprazole  No abdominal pain  incisions healing well  Constipation regularly now- getting plenty of water, no issues pre-surgery, miralax twice weekly - BM very irregular    Some struggles with depression and coping with eating since surgery. Working with therapist. Working on stress/ emotional/boredome eating.    Weight History:     2019   What is your highest lifetime weight? 375   What is your lowest weight since surgery? (In pounds) 269     Initial Weight: 167.4 kg (369 lb)  Current Weight: Weight: 125.1 kg (275 lb  11.2 oz)  Cumulative weight loss (lbs): 93.3  Last Visits Weight: 149.1 kg (328 lb 12.8 oz)  Date of surgery 329lb    Questions Regarding Co-Morbidities and Health Concerns Reviewed With Patient 4/8/2019   Pre-diabetes: Never   Diabetes II: Never   High Blood Pressure: Never   High cholesterol: Never   Heartburn/Reflux: Improved   Sleep apnea: Never   PCOS: Never   Back pain: Never   Joint pain: Improved   Lower leg swelling: Improved     Can be more active now  Signed up for 5k in June- friend is pushing her with this    Eating Habits 4/8/2019   How many meals do you eat per day? 3   Do you snack between meals? Sometimes   How much food are you eating at each meal? 1/2 cup to 1 cup   Are you able to separate your meals and liquids by at least 30 minutes? Sometimes   Are you able to avoid liquid calories? Sometimes       Exercise Questions Reviewed With Patient 4/8/2019   How often do you exercise? 3 to 4 times per week   What is the duration of your exercise (in minutes)? 45 Minutes   What types of exercise do you do? walking, gym membership, climbing stairs at work   What keeps you from being more active?  Worried people will look at me     Feels more able to avoid snacks and boredom eating when taking 1/2 tab of phentermine. Taking phentermine occasionally. Took before surgery with success. Notes has also tried topiramate which didn't notice any benefit.     Social History:      4/8/2019   Are you smoking? No   Are you drinking alcohol? No       Medications:  Current Outpatient Medications   Medication     Multiple Vitamin (MULTI-VITAMINS) TABS     phentermine (ADIPEX-P) 37.5 MG tablet     ibuprofen (ADVIL/MOTRIN) 200 MG tablet     No current facility-administered medications for this visit.          4/8/2019   Do you avoid NSAIDs such as (Ibuprofen, Aleve, Naproxen, Advil)?   No       ROS:  GI:      4/8/2019   Vomiting: No   Diarrhea: No   Constipation: Yes   Swallowing trouble: No   Abdominal pain: No  "  Heartburn: No   Rash in skin folds: No   Depression: Yes   Stress urinary incontinence No     Skin:   BAR RBS ROS - SKIN 4/8/2019   Rash in skin folds: No     Psych:      4/8/2019   Depression: Yes   Anxiety: Yes   working with therapist    Female Only:   BAR RBS ROS - FEMALE ONLY 4/8/2019   Female only: Birth control       LABS/IMAGING/MEDICAL RECORDS REVIEW:     PHYSICAL EXAMINATION:  /70 (BP Location: Left arm, Patient Position: Chair, Cuff Size: Adult Large)   Pulse 79   Temp 98.6  F (37  C) (Oral)   Ht 1.727 m (5' 7.99\")   Wt 125.1 kg (275 lb 11.2 oz)   SpO2 98%   Breastfeeding? No   BMI 41.93 kg/m      General: No apparent distress  Neuro: A & O x 3  Head: Atraumatic, normocephalic  Skin: warm and dry, no rashes on exposed skin  Respiratory: respirations unlabored  Abdomen: soft, non-tender  Extremities: No LE swelling    ASSESSMENT AND PLAN:      1. 4 months status laparoscopic gastric sleeve  2. Morbid Obesity current BMI: Body mass index is 41.93 kg/m .  3. Post surgical malabsorption:   Labs ordered per protocol.   Follow food plan per dietitian recommendations.   Continue taking recommended post-op vitamins.  4. Return to clinic in 3-4 months.  5. Discussed pros and cons with continuing phentermine long term, off label, for weight management. Refilled today. Will continue to monitor. Patient feels it is helping currently.   6. Discussed vitamins and supplements with myself and dietitian, will review again by joe once labs are back    Pathology normal after surgery  S/p porsha     Sincerely,    NATHANAEL Higuera CNP   I spent a total of 30 minutes face to face with Graciela during today's office visit. Over 50% of this time was spent counseling the patient and/or coordinating care.    "

## 2019-04-08 NOTE — PATIENT INSTRUCTIONS
Goals:  1) Continue to follow the bariatric regular diet   2) Pt to consume 60 gm protein/day  3) Pt to consume 48-64 oz fluids/day- between meals only  4)Eat slowly (>20 min/meal), chewing well to smooth consistency once on the bariatric regular diet.  5) Limit portions to 1/2 cup/meal  6) *Start 500 mg Calcium Citrate + vitamin D TID and vitamin D3 (5,000 international unit(s)) and vitamin B12  7) check on calorie/protein level of the protein shake at health bar     *Protein Shake Criteria: no more than 210 Calories, at least 20 grams of protein, and less than 10 grams of sugar     Follow up with RD in 2 months    Margaret Galvin, MS, RD, LD   If you need to schedule or reschedule with a dietitian please call [phone].

## 2019-04-08 NOTE — LETTER
4/8/2019       RE: Graciela Christianson  [address] [address]     Dear Colleague,    Thank you for referring your patient, Graciela Christianson, to the [facility] SURGICAL WEIGHT MANAGEMENT at [facility]. Please see a copy of my visit note below.    Nutrition Assessment  Reason For Visit:  Graciela Christianson is a 23 year old female presenting today for nutrition follow-up, 4 month s/p Sleeve gastrectomy with Dr. Hopper on (12/11/18) . Pt referred by Jeanine Ford NP (4/8/19).     Anthropometrics  Initial Consult Weight: 369 lbs  Day of Surgery Weight:149.4 kg (329 lbs 4.8 oz)  Current Weight:275.7 lbs  Weight loss: - 93.3 lbs from initial consult; -53.6 lbs from day of surgery    Current Vitamins/Minerals: Multivitamin    Nutrition History  Diet Recall:   B: Protein shake breakfast   L: Working through lunch: snacks on 2 cheese sicks   D:Grilled chicken with vegetable   Snack: none   Beverages: water with flavoring, beverages (was drinking mountain dew, now not).     Progress toward previous goals:  1) Follow bariatric low-fat full liquid diet through day 13 post-op, then to progress to pureed diet x 2 weeks.  If tolerating, may advance on day 29 post-op to bariatric soft diet. -Met   2) Work towards 60 gm protein/day.-Continues   3) Consume 48-64 oz fluids daily- between meals.-Met   4) Eat slowly (>20 min/meal), chewing well to smooth consistency once on the bariatric soft diet.-Met   5) Limit portions to 1/2 cup/meal.-Met   6) Start chewable/liquid multivitamin/minerals twice daily.-Met      GI: constipated easily.     Exercising: 3 x week and walk on the treadmill or on the bike ~45 minutes.    Nutrition Prescription:  Grams Protein: 60 (minimum)  Amount of Fluid: 48-64 oz    Nutrition Diagnosis  Previous: Food and nutrition-related knowledge deficit r/t lack of prior exposure to diet advancements beyond bariatric low-fat full liquid diet aeb pt unable to  verbalize understanding of bariatric pureed and soft diets.    Current: Obesity r/t long history of self-monitoring deficit and excessive energy intake aeb BMI >30    Intervention  Materials/Education provided on bariatric regular consistency diets, protein intake, fluid intake, eating pace, chewing foods well, portion control, sugar/fat intake, recommended vitamin/mineral supplements.   - Focused on supplements as pt was only taking a multivitamin at her 3 month post-op visit.     Patient Understanding: Good   Expected Compliance: Good     Goals:    Goals:  1) Continue to follow the bariatric regular diet   2) Pt to consume 60 gm protein/day  3) Pt to consume 48-64 oz fluids/day- between meals only  4)Eat slowly (>20 min/meal), chewing well to smooth consistency once on the bariatric regular diet.  5) Limit portions to 1/2 cup/meal  6) *Start 500 mg Calcium Citrate + vitamin D TID and vitamin D3 (5,000 international unit(s)) and vitamin B12  7) check on calorie/protein level of the protein shake at health bar     *Protein Shake Criteria: no more than 210 Calories, at least 20 grams of protein, and less than 10 grams of sugar     Follow-Up: 2 months      Time spent with patient: 30 minutes.    Again, thank you for allowing me to participate in the care of your patient.      Sincerely,    Margaret Galvin RD

## 2019-04-08 NOTE — PATIENT INSTRUCTIONS
It was a pleasure meeting with you today.     Thank you for allowing us the privilege of caring for you. We hope we provided you with the excellent service you deserve.     Please let us know if there is anything else we can do for you so that we can be sure you are leaving completely satisfied with your care experience.      You saw NATHANAEL Higuera CNP today.     Instructions per today's visit:     Continue phentermine  Consider reading about mindful eating- eat what you love, love what you eat  Labs today  miralax okay daily  Continue to get plenty of water      To schedule appointments with our team, please call [phone] option #1    Please call during clinic hours Monday through Friday 8:00a - 4:00p if you have questions or you can contact us via [employer] at anytime.      Nurses: [phone]  Fax: [phone]  Surgery Scheduler: [phone]    Please call the hospital at [phone] to speak with our on call MDs if you have urgent needs after hours, during weekends, or holidays.    **Please note if you need to go to the Emergency Room for any reason in the first 90 days after surgery it is important to go to the [facility] ER on the [place] on [facility] off of the [address] off [address]    *For patients who are currently enrolled in our program and have not yet had weight loss surgery please note*:    You must be at your required goal weight at the time of your Anesthesia clinic visit as well as the day of surgery or your surgery will be canceled. You will not be able to obtain a new surgery date until you have met your goal weight.    Lab results will be communicated through My Chart or letter (if My Chart not used). Please call the clinic if you have not received communication after 1 week or if you have any questions.?      Main follow-up parameters for all bariatric patients     Patients who have undergone Bariatric surgery:    1. Follow-up interval during the first year: ~1 week, 1  month, 3 month, 6 month,12 months.  Every 6 months until 5 years; and then annually thereafter.  The goal of follow-up sessions is to ensure that food intake behavior (choice of food and eating speed) and exercise/activity level are set appropriately.    2. Yearly lab tests ordered by our clinic.      3. The bariatric team should be aware and potentially evaluate all adverse gastrointestinal symptoms (dysphagia, abdominal pain, nausea, vomiting, diarrhea, heartburn, reflux, etc), which can be a sign of complication.  The bariatric surgeons perform general surgery procedures in addition to bariatric surgery (laparoscopic cholecystectomy, etc.)    4. Inability to tolerate textured food (chicken, steak, fish, eggs, beans) is NOT normal and may need to be evaluated by endoscopy performed by the bariatric surgeon.

## 2019-04-09 DIAGNOSIS — E55.9 VITAMIN D DEFICIENCY: ICD-10-CM

## 2019-04-09 DIAGNOSIS — E56.9 VITAMIN DEFICIENCY: ICD-10-CM

## 2019-04-09 DIAGNOSIS — Z98.84 BARIATRIC SURGERY STATUS: Primary | ICD-10-CM

## 2019-04-09 LAB — DEPRECATED CALCIDIOL+CALCIFEROL SERPL-MC: 16 UG/L (ref 20–75)

## 2019-04-12 LAB
ANNOTATION COMMENT IMP: NORMAL
RETINYL PALMITATE SERPL-MCNC: <0.02 MG/L (ref 0–0.1)
VIT A SERPL-MCNC: 0.42 MG/L (ref 0.3–1.2)

## 2020-01-02 ENCOUNTER — MYC REFILL (OUTPATIENT)
Dept: SURGERY | Facility: CLINIC | Age: 25
End: 2020-01-02

## 2020-01-02 DIAGNOSIS — E66.01 MORBID OBESITY (H): ICD-10-CM

## 2020-01-03 RX ORDER — PHENTERMINE HYDROCHLORIDE 37.5 MG/1
0.5 TABLET ORAL EVERY MORNING
Qty: 15 TABLET | Refills: 3 | Status: SHIPPED | OUTPATIENT
Start: 2020-01-03

## 2020-03-10 ENCOUNTER — HEALTH MAINTENANCE LETTER (OUTPATIENT)
Age: 25
End: 2020-03-10

## 2020-12-27 ENCOUNTER — HEALTH MAINTENANCE LETTER (OUTPATIENT)
Age: 25
End: 2020-12-27

## 2021-04-24 ENCOUNTER — HEALTH MAINTENANCE LETTER (OUTPATIENT)
Age: 26
End: 2021-04-24

## 2021-10-09 ENCOUNTER — HEALTH MAINTENANCE LETTER (OUTPATIENT)
Age: 26
End: 2021-10-09

## 2022-05-21 ENCOUNTER — HEALTH MAINTENANCE LETTER (OUTPATIENT)
Age: 27
End: 2022-05-21

## 2022-09-11 ENCOUNTER — HEALTH MAINTENANCE LETTER (OUTPATIENT)
Age: 27
End: 2022-09-11

## 2023-06-03 ENCOUNTER — HEALTH MAINTENANCE LETTER (OUTPATIENT)
Age: 28
End: 2023-06-03

## (undated) DEVICE — NDL INSUFFLATION 13GA 150MM C2202

## (undated) DEVICE — STPL POWERED ECHELON LONG 60MM PLEE60A

## (undated) DEVICE — LIGHT HANDLE X1 31140133

## (undated) DEVICE — ESU GROUND PAD ADULT W/CORD E7507

## (undated) DEVICE — GLOVE PROTEXIS POWDER FREE SMT 7.5  2D72PT75X

## (undated) DEVICE — PREP CHLORAPREP 26ML TINTED ORANGE  260815

## (undated) DEVICE — ENDO TROCAR FIRST ENTRY KII FIOS Z-THRD 05X100MM CTF03

## (undated) DEVICE — Device

## (undated) DEVICE — ENDO POUCH UNIVERSAL RETRIEVAL SYSTEM INZII 12/15MM CD004

## (undated) DEVICE — STPL SKIN 35W ROTATING HEAD PRW35

## (undated) DEVICE — ESU LIGASURE MARYLAND VESSEL LAP 44CM XLONG LF1944

## (undated) DEVICE — CLIP APPLIER ENDO 5MM M/L LIGAMAX EL5ML

## (undated) DEVICE — COVER CAMERA IN-LIGHT DISP LT-C02

## (undated) DEVICE — ANTIFOG SOLUTION W/FOAM PAD 31142527

## (undated) DEVICE — ENDO TROCAR SLEEVE KII Z-THREADED 05X100MM CTS02

## (undated) DEVICE — DRSG PRIMAPORE 02X3" 7133

## (undated) DEVICE — STPL RELOAD REG TISSUE ECHELON 60 X 3.6MM BLUE GST60B

## (undated) DEVICE — ENDO TROCAR OPTICAL ACCESS KII Z-THRD 15X100MM C0R37

## (undated) DEVICE — LINEN TOWEL PACK X6 WHITE 5487

## (undated) DEVICE — LINEN TOWEL PACK X30 5481

## (undated) RX ORDER — HYDROMORPHONE HYDROCHLORIDE 1 MG/ML
INJECTION, SOLUTION INTRAMUSCULAR; INTRAVENOUS; SUBCUTANEOUS
Status: DISPENSED
Start: 2018-12-11

## (undated) RX ORDER — FENTANYL CITRATE 50 UG/ML
INJECTION, SOLUTION INTRAMUSCULAR; INTRAVENOUS
Status: DISPENSED
Start: 2018-12-11

## (undated) RX ORDER — SODIUM CHLORIDE, SODIUM LACTATE, POTASSIUM CHLORIDE, CALCIUM CHLORIDE 600; 310; 30; 20 MG/100ML; MG/100ML; MG/100ML; MG/100ML
INJECTION, SOLUTION INTRAVENOUS
Status: DISPENSED
Start: 2018-12-11

## (undated) RX ORDER — CEFAZOLIN SODIUM 2 G/100ML
INJECTION, SOLUTION INTRAVENOUS
Status: DISPENSED
Start: 2018-12-11

## (undated) RX ORDER — ONDANSETRON 2 MG/ML
INJECTION INTRAMUSCULAR; INTRAVENOUS
Status: DISPENSED
Start: 2018-12-11

## (undated) RX ORDER — BUPIVACAINE HYDROCHLORIDE 2.5 MG/ML
INJECTION, SOLUTION EPIDURAL; INFILTRATION; INTRACAUDAL
Status: DISPENSED
Start: 2018-12-11

## (undated) RX ORDER — PROPOFOL 10 MG/ML
INJECTION, EMULSION INTRAVENOUS
Status: DISPENSED
Start: 2018-12-11

## (undated) RX ORDER — EPHEDRINE SULFATE 50 MG/ML
INJECTION, SOLUTION INTRAMUSCULAR; INTRAVENOUS; SUBCUTANEOUS
Status: DISPENSED
Start: 2018-12-11

## (undated) RX ORDER — PHENYLEPHRINE HCL IN 0.9% NACL 1 MG/10 ML
SYRINGE (ML) INTRAVENOUS
Status: DISPENSED
Start: 2018-12-11

## (undated) RX ORDER — KETOROLAC TROMETHAMINE 30 MG/ML
INJECTION, SOLUTION INTRAMUSCULAR; INTRAVENOUS
Status: DISPENSED
Start: 2018-12-11